# Patient Record
Sex: FEMALE | Race: WHITE | NOT HISPANIC OR LATINO | Employment: OTHER | URBAN - METROPOLITAN AREA
[De-identification: names, ages, dates, MRNs, and addresses within clinical notes are randomized per-mention and may not be internally consistent; named-entity substitution may affect disease eponyms.]

---

## 2018-11-05 LAB — HIV 1+2 AB+HIV1 P24 AG SERPL QL IA: NORMAL

## 2018-12-04 LAB
HPV, HIGH-RISK: NEGATIVE
PAP SMEAR: NORMAL

## 2019-11-27 ENCOUNTER — OFFICE VISIT (OUTPATIENT)
Dept: FAMILY MEDICINE | Facility: CLINIC | Age: 64
End: 2019-11-27
Payer: MEDICARE

## 2019-11-27 VITALS
TEMPERATURE: 98 F | DIASTOLIC BLOOD PRESSURE: 74 MMHG | WEIGHT: 154.75 LBS | SYSTOLIC BLOOD PRESSURE: 126 MMHG | OXYGEN SATURATION: 97 % | BODY MASS INDEX: 24.29 KG/M2 | HEART RATE: 73 BPM | HEIGHT: 67 IN

## 2019-11-27 DIAGNOSIS — H40.9 GLAUCOMA OF BOTH EYES, UNSPECIFIED GLAUCOMA TYPE: ICD-10-CM

## 2019-11-27 DIAGNOSIS — Z12.11 COLON CANCER SCREENING: ICD-10-CM

## 2019-11-27 DIAGNOSIS — R56.9 SEIZURES: ICD-10-CM

## 2019-11-27 DIAGNOSIS — M54.50 CHRONIC BILATERAL LOW BACK PAIN WITHOUT SCIATICA: Primary | ICD-10-CM

## 2019-11-27 DIAGNOSIS — G89.29 CHRONIC BILATERAL LOW BACK PAIN WITHOUT SCIATICA: Primary | ICD-10-CM

## 2019-11-27 DIAGNOSIS — Z12.31 BREAST CANCER SCREENING BY MAMMOGRAM: ICD-10-CM

## 2019-11-27 DIAGNOSIS — G47.01 INSOMNIA DUE TO MEDICAL CONDITION: ICD-10-CM

## 2019-11-27 DIAGNOSIS — F43.10 PTSD (POST-TRAUMATIC STRESS DISORDER): ICD-10-CM

## 2019-11-27 DIAGNOSIS — I71.40 ABDOMINAL AORTIC ANEURYSM (AAA) 3.0 CM TO 5.0 CM IN DIAMETER IN FEMALE: ICD-10-CM

## 2019-11-27 DIAGNOSIS — R06.2 WHEEZING: ICD-10-CM

## 2019-11-27 PROCEDURE — 99999 PR PBB SHADOW E&M-NEW PATIENT-LVL IV: ICD-10-PCS | Mod: PBBFAC,,, | Performed by: INTERNAL MEDICINE

## 2019-11-27 PROCEDURE — 3008F BODY MASS INDEX DOCD: CPT | Mod: CPTII,S$GLB,, | Performed by: INTERNAL MEDICINE

## 2019-11-27 PROCEDURE — 99499 UNLISTED E&M SERVICE: CPT | Mod: S$GLB,,, | Performed by: INTERNAL MEDICINE

## 2019-11-27 PROCEDURE — 99499 RISK ADDL DX/OHS AUDIT: ICD-10-PCS | Mod: S$GLB,,, | Performed by: INTERNAL MEDICINE

## 2019-11-27 PROCEDURE — 99204 PR OFFICE/OUTPT VISIT, NEW, LEVL IV, 45-59 MIN: ICD-10-PCS | Mod: S$GLB,,, | Performed by: INTERNAL MEDICINE

## 2019-11-27 PROCEDURE — 3008F PR BODY MASS INDEX (BMI) DOCUMENTED: ICD-10-PCS | Mod: CPTII,S$GLB,, | Performed by: INTERNAL MEDICINE

## 2019-11-27 PROCEDURE — 99999 PR PBB SHADOW E&M-NEW PATIENT-LVL IV: CPT | Mod: PBBFAC,,, | Performed by: INTERNAL MEDICINE

## 2019-11-27 PROCEDURE — 99204 OFFICE O/P NEW MOD 45 MIN: CPT | Mod: S$GLB,,, | Performed by: INTERNAL MEDICINE

## 2019-11-27 RX ORDER — DIAZEPAM 2 MG/1
2 TABLET ORAL 4 TIMES DAILY
Qty: 120 TABLET | Refills: 3 | Status: SHIPPED | OUTPATIENT
Start: 2019-11-27 | End: 2020-03-19 | Stop reason: SDUPTHER

## 2019-11-27 RX ORDER — DORZOLAMIDE HCL 20 MG/ML
SOLUTION/ DROPS OPHTHALMIC
COMMUNITY
Start: 2019-03-13 | End: 2022-09-22 | Stop reason: ALTCHOICE

## 2019-11-27 RX ORDER — LATANOPROST 50 UG/ML
SOLUTION/ DROPS OPHTHALMIC
Refills: 11 | COMMUNITY
Start: 2019-11-25 | End: 2023-01-04 | Stop reason: SDUPTHER

## 2019-11-27 RX ORDER — BRIMONIDINE TARTRATE 1 MG/ML
SOLUTION/ DROPS OPHTHALMIC
COMMUNITY
End: 2022-09-22 | Stop reason: SDUPTHER

## 2019-11-27 RX ORDER — TEMAZEPAM 15 MG/1
15 CAPSULE ORAL NIGHTLY PRN
Qty: 30 CAPSULE | Refills: 3 | Status: SHIPPED | OUTPATIENT
Start: 2019-11-27 | End: 2019-12-27

## 2019-11-27 RX ORDER — KETOROLAC TROMETHAMINE 10 MG/1
10 TABLET, FILM COATED ORAL EVERY 6 HOURS PRN
Qty: 30 TABLET | Refills: 3 | Status: SHIPPED | OUTPATIENT
Start: 2019-11-27 | End: 2024-03-11

## 2019-11-27 RX ORDER — TIMOLOL MALEATE 5 MG/ML
SOLUTION/ DROPS OPHTHALMIC
Refills: 4 | COMMUNITY
Start: 2019-10-31 | End: 2022-09-22 | Stop reason: ALTCHOICE

## 2019-11-27 RX ORDER — ALBUTEROL SULFATE 90 UG/1
2 AEROSOL, METERED RESPIRATORY (INHALATION) EVERY 6 HOURS PRN
Qty: 18 G | Refills: 3 | Status: SHIPPED | OUTPATIENT
Start: 2019-11-27 | End: 2023-07-26

## 2019-11-27 NOTE — PROGRESS NOTES
Patient ID: Fauzia Cornelius     Chief Complaint:   Chief Complaint   Patient presents with    [facility]    Abdominal aneurysym     Dr. Eric martin. is watching it    Tinnitus        HPI: New Patient to me and [facility]. Has a few Complains of today: lumbago without sciatica due to a L5/S1 bone on bone. She takes a good amount of aspirin each day and that I think is causing her tinnitus. Will switch her to Toradol 10 mg every 6 hours as needed and advise her to stop aspirin. Admits to PTSD that's been bad over the past year. She was seeing Dr. Fulton, but he has left. 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 She also had Temazepam at night as needed. Has a known 3 cm Abdominal Aortic Aneurysm that Dr. Eros Reyes will monitor.     Review of Systems   Constitutional: Negative.    HENT: Negative.    Eyes: Negative.    Respiratory: Negative.    Cardiovascular: Negative.    Gastrointestinal: Negative.    Endocrine: Negative.    Genitourinary: Negative.    Musculoskeletal: Negative.    Skin: Negative.    Allergic/Immunologic: Negative.    Neurological: Negative.    Hematological: Negative.    Psychiatric/Behavioral: Negative.           Objective:      Physical Exam   Physical Exam   Constitutional: She is oriented to person, place, and time. She appears well-developed and well-nourished.   HENT:   Head: Normocephalic and atraumatic.   Nose: Nose normal.   Mouth/Throat: Oropharynx is clear and moist.   Very poor dentition    Eyes: Pupils are equal, round, and reactive to light. Conjunctivae and EOM are normal.   Left eye coloboma    Neck: Normal range of motion. Neck supple.   Cardiovascular: Normal rate, regular rhythm, normal heart sounds and intact distal pulses.   Pulmonary/Chest: Effort normal.   Barrel Chest Deformity. Mild Left sided expiratory wheezes. Decreased Breath Sounds in bilateral bases.    Abdominal: Soft. Bowel sounds are  "normal.   Musculoskeletal: Normal range of motion.   Neurological: She is alert and oriented to person, place, and time.   Skin: Skin is warm and dry. Capillary refill takes less than 2 seconds.   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 Her behavior is normal. Judgment and thought content normal.   Nursing note and vitals reviewed.      Current Outpatient Medications:     brimonidine 0.1% (ALPHAGAN P) 0.1 % Drop, Alphagan P 0.1 % eye drops, Disp: , Rfl:     dorzolamide (TRUSOPT) 2 % ophthalmic solution, INSTILL ONE DROP IN EACH EYE THREE TIMES DAILY, Disp: , Rfl:     latanoprost 0.005 % ophthalmic solution, put ONE drop IN EACH EYE EVERY DAY, Disp: , Rfl: 11    timolol maleate 0.5% (TIMOPTIC) 0.5 % Drop, PUT ONE DROP IN EACH EYE TWICE DAILY, Disp: , Rfl: 4    albuterol (VENTOLIN HFA) 90 mcg/actuation inhaler, Inhale 2 puffs into the lungs every 6 (six) hours as needed for Wheezing. Rescue, Disp: 18 g, Rfl: 3    diazePAM (VALIUM) 2 MG tablet, Take 1 tablet (2 mg total) by mouth 4 (four) times daily., Disp: 120 tablet, Rfl: 3    ketorolac (TORADOL) 10 mg tablet, Take 1 tablet (10 mg total) by mouth every 6 (six) hours as needed for Pain., Disp: 30 tablet, Rfl: 3    temazepam (RESTORIL) 15 mg Cap, Take 1 capsule (15 mg total) by mouth nightly as needed., Disp: 30 capsule, Rfl: 3         Vitals:   Vitals:    11/27/19 1157   BP: 126/74   Pulse: 73   Temp: 97.5 °F (36.4 °C)   TempSrc: Temporal   SpO2: 97%   Weight: 70.2 kg (154 lb 12.2 oz)   Height: 5' 7" (1.702 m)      Assessment:       Patient Active Problem List    Diagnosis Date Noted    Insomnia due to medical condition 11/27/2019    Glaucoma     Lumbago without sciatica     PTSD (post-traumatic stress disorder)     Seizures     Abdominal aortic aneurysm (AAA) 3.0 cm to 5.0 cm in diameter in female           Plan:       Fauzia was seen today for " establish care, abdominal aneurysym and tinnitus.    Diagnoses and all orders for this visit:    Chronic bilateral low back pain without sciatica  -     ketorolac (TORADOL) 10 mg tablet; Take 1 tablet (10 mg total) by mouth every 6 (six) hours as needed for Pain.  -     X-Ray Lumbar Spine Complete 5 View; Future  Avoid aspirin     Insomnia due to medical condition  -     temazepam (RESTORIL) 15 mg Cap; Take 1 capsule (15 mg total) by mouth nightly as needed.    PTSD (post-traumatic stress disorder)  -     diazePAM (VALIUM) 2 MG tablet; Take 1 tablet (2 mg total) by mouth 4 (four) times daily.  -     Ambulatory referral to Psychology    Glaucoma of both eyes, unspecified glaucoma type  Continue drops    Seizures  -     CBC auto differential; Future  -     Comprehensive metabolic panel; Future  -     Hepatitis C antibody; Future  -     Lipid panel; Future    Abdominal aortic aneurysm (AAA) 3.0 cm to 5.0 cm in diameter in female  -     Lipid panel; Future    Breast cancer screening by mammogram  -     Mammo Digital Screening Bilat w/ Charly; Future  -     Mammo Digital Screening Bilat w/ Charly    Colon cancer screening  -     Case request GI: COLONOSCOPY    Wheezing  -     albuterol (VENTOLIN HFA) 90 mcg/actuation inhaler; Inhale 2 puffs into the lungs every 6 (six) hours as needed for Wheezing. Rescue  Please consider stop smoking

## 2019-12-02 DIAGNOSIS — Z12.11 COLON CANCER SCREENING: ICD-10-CM

## 2019-12-19 ENCOUNTER — TELEPHONE (OUTPATIENT)
Dept: FAMILY MEDICINE | Facility: CLINIC | Age: 64
End: 2019-12-19

## 2019-12-19 DIAGNOSIS — F43.10 PTSD (POST-TRAUMATIC STRESS DISORDER): ICD-10-CM

## 2019-12-19 NOTE — TELEPHONE ENCOUNTER
----- Message from Hazel Cuenca sent at 12/19/2019  3:04 PM CST -----  Contact: Patient  Type: Needs Medical Advice    Who Called:  Patient  Best Call Back Number:   Additional Information: Calling to speak with the nurse to discuss a change in her medication that she is requesting. She would like her diazePAM (VALIUM) 2 MG tablet to upped to 5mg, and her temazepam (RESTORIL) 15 mg Cap to be upped to 30 mg. She advised she is not due for a refill just yet but would like this dosage to reflect on her next one.

## 2019-12-19 NOTE — TELEPHONE ENCOUNTER
Callback to patient--asking to increase Temazepam to 30mg nightly, current dosage is not really helping her sleep.    Patient also asking to increase her Diazepam to 5mg, states she has bad anxiety and PTSD and current medication is just not controlling her anxiety

## 2019-12-21 NOTE — TELEPHONE ENCOUNTER
Diazepam and Temazepam are in the same family. The more you take of one, the less effective the other becomes. She's got Diazepam 2 mg - 4 times daily as needed. I'm not comfortable increasing that does to 5 mg as I'm afraid she will overdose. I would agree to increasing the Temazepam to 30 mg / night, but I want her to make an appointment with me soon so we can discuss other meds to treat her PTSD.

## 2020-01-10 ENCOUNTER — TELEPHONE (OUTPATIENT)
Dept: FAMILY MEDICINE | Facility: CLINIC | Age: 65
End: 2020-01-10

## 2020-01-10 NOTE — TELEPHONE ENCOUNTER
I have tried to call the patient multiple times. The phone number listed is not in service and there is not an alternative phone number listed in her file.

## 2020-01-10 NOTE — TELEPHONE ENCOUNTER
----- Message from Blank Mulligan sent at 1/10/2020  2:28 PM CST -----  Contact: pt  Pt calling states that she had called back before the holidays requesting that she would like her diazePAM (VALIUM) 2 MG tablet to upped to 5mg, and her temazepam (RESTORIL) 15 mg Cap to be upped to 30 mg an no one called her back. Also pt says that her insurance is no covering her ketorolac (TORADOL) 10 mg tablet and charging her full price so she is wanting an alternative that is less expensive....[phone] (home)           .    [facility] [place] - [address]  [address]  [place] [address] [address]  Phone: [phone] Fax: [phone]

## 2020-02-28 ENCOUNTER — TELEPHONE (OUTPATIENT)
Dept: GASTROENTEROLOGY | Facility: CLINIC | Age: 65
End: 2020-02-28

## 2020-03-12 ENCOUNTER — TELEPHONE (OUTPATIENT)
Dept: GASTROENTEROLOGY | Facility: CLINIC | Age: 65
End: 2020-03-12

## 2020-03-19 ENCOUNTER — TELEPHONE (OUTPATIENT)
Dept: FAMILY MEDICINE | Facility: CLINIC | Age: 65
End: 2020-03-19

## 2020-03-19 DIAGNOSIS — F43.10 PTSD (POST-TRAUMATIC STRESS DISORDER): ICD-10-CM

## 2020-03-19 DIAGNOSIS — G47.01 INSOMNIA DUE TO MEDICAL CONDITION: ICD-10-CM

## 2020-03-19 RX ORDER — DIAZEPAM 2 MG/1
2 TABLET ORAL 4 TIMES DAILY
Qty: 120 TABLET | Refills: 3 | Status: SHIPPED | OUTPATIENT
Start: 2020-03-19 | End: 2020-06-11 | Stop reason: SDUPTHER

## 2020-03-19 RX ORDER — TEMAZEPAM 30 MG/1
30 CAPSULE ORAL NIGHTLY PRN
Qty: 30 CAPSULE | Refills: 3 | Status: SHIPPED | OUTPATIENT
Start: 2020-03-19 | End: 2020-04-18

## 2020-03-19 NOTE — TELEPHONE ENCOUNTER
I refilled her diazepam as written.  I also sent in a refill for temazepam 30 mg per night per her request.  Diazepam and temazepam are in the same family so it is very probable that she will get tolerant to both these medicines the longer she takes them.  This will manifest as her not getting the same effect after she takes the medicines as she did in the past.

## 2020-03-19 NOTE — TELEPHONE ENCOUNTER
Asking for refill diazepam and temazepam--did not pend, because patient is asking for increase to temazepam 30mg(states more effective)    To [facility] in [place]

## 2020-03-19 NOTE — TELEPHONE ENCOUNTER
----- Message from Laure Vitale sent at 3/19/2020  3:27 PM CDT -----  Contact: Fauzia oh  Type:  RX Refill Request    Who Called:  Fauzia  Refill or New Rx:  refill  RX Name and Strength:  diazePAM (VALIUM) 2 MG tablet, temazepam (RESTORIL) 15 mg Cap  How is the patient currently taking it? (ex. 1XDay):  As directed  Is this a 30 day or 90 day RX:  30  Preferred Pharmacy with phone number:      [facility] - [place], [place] - [address]  [address] [address]  Phone: [phone] Fax: [phone]      Local or Mail Order:  local  Ordering Provider:  Myles Santos Call Back Number:  [phone]  Additional Information:  Pls call pt regarding her refills

## 2020-04-16 ENCOUNTER — TELEPHONE (OUTPATIENT)
Dept: FAMILY MEDICINE | Facility: CLINIC | Age: 65
End: 2020-04-16

## 2020-04-16 DIAGNOSIS — L29.9 ITCHING: Primary | ICD-10-CM

## 2020-04-16 RX ORDER — HYDROXYZINE HYDROCHLORIDE 25 MG/1
25 TABLET, FILM COATED ORAL 3 TIMES DAILY PRN
Qty: 30 TABLET | Refills: 0 | Status: SHIPPED | OUTPATIENT
Start: 2020-04-16 | End: 2020-06-11 | Stop reason: SDUPTHER

## 2020-04-16 NOTE — TELEPHONE ENCOUNTER
I sent a limited quantity of Atarax to pharmacy. Do you know why you're itching? Let me know if the itching persists.

## 2020-04-16 NOTE — TELEPHONE ENCOUNTER
----- Message from Gamaliel Marcano sent at 4/16/2020  3:20 PM CDT -----  Contact: pt  Pt is requesting a prescription for Atarax to be sent over to the pharmacy (it's an anti itch medication)   Call Back#[phone]  Thanks      [facility] [place] - [facility] [address]  Phone: [phone] Fax: [phone]

## 2020-05-13 ENCOUNTER — TELEPHONE (OUTPATIENT)
Dept: FAMILY MEDICINE | Facility: CLINIC | Age: 65
End: 2020-05-13

## 2020-05-13 NOTE — TELEPHONE ENCOUNTER
----- Message from Albania De La Torre sent at 5/13/2020  5:07 PM CDT -----  Contact: [phone]/ self   Patient requesting to speak with you regarding orders that she needs faxed over to [facility]. Please advise.

## 2020-05-15 ENCOUNTER — TELEPHONE (OUTPATIENT)
Dept: FAMILY MEDICINE | Facility: CLINIC | Age: 65
End: 2020-05-15

## 2020-05-15 NOTE — TELEPHONE ENCOUNTER
Callback to patient--needs order faxed to [facility] [place]      Patient states she has extreme low back pain and wants to be assured that we can fix her problems with her back    Advised depends that depending on what xray show; would depend on what plan of care would be    Fax [phone]

## 2020-05-15 NOTE — TELEPHONE ENCOUNTER
----- Message from Irma Martinez sent at 5/15/2020  1:21 PM CDT -----  Contact: Pt  Type: Needs Medical Advice    Who Called:  Pt  Best Call Back Number: [phone]  Additional Information: Requesting a call back regarding pt woud like all her orders be sent to diagnostic imaging so she can have them done before pt sees doctor   Please Advise ---Thank you

## 2020-05-27 ENCOUNTER — PATIENT OUTREACH (OUTPATIENT)
Dept: ADMINISTRATIVE | Facility: HOSPITAL | Age: 65
End: 2020-05-27

## 2020-05-27 LAB — HEP C VIRUS AB: REACTIVE

## 2020-05-27 NOTE — PROGRESS NOTES
Chart review completed 05/27/2020.  Care Everywhere updates requested and reviewed.  Immunizations reconciled. Media reviewed.     Lab willie, and quest reviewed.   updated with external mammogram report.   Message sent to patient's portal.    Health Maintenance Due   Topic Date Due    Hepatitis C Screening  1955    Lipid Panel  1955    HIV Screening  03/07/1971    TETANUS VACCINE  03/07/1974    Shingles Vaccine (1 of 2) 03/07/2006    Colonoscopy  03/07/2006    Pap Smear with HPV Cotest  02/26/2019

## 2020-06-11 ENCOUNTER — OFFICE VISIT (OUTPATIENT)
Dept: FAMILY MEDICINE | Facility: CLINIC | Age: 65
End: 2020-06-11
Payer: MEDICARE

## 2020-06-11 VITALS
HEIGHT: 67 IN | BODY MASS INDEX: 25.6 KG/M2 | WEIGHT: 163.13 LBS | OXYGEN SATURATION: 95 % | HEART RATE: 74 BPM | DIASTOLIC BLOOD PRESSURE: 84 MMHG | SYSTOLIC BLOOD PRESSURE: 128 MMHG | TEMPERATURE: 98 F

## 2020-06-11 DIAGNOSIS — F43.10 PTSD (POST-TRAUMATIC STRESS DISORDER): ICD-10-CM

## 2020-06-11 DIAGNOSIS — G47.01 INSOMNIA DUE TO MEDICAL CONDITION: ICD-10-CM

## 2020-06-11 DIAGNOSIS — L29.9 ITCHING: ICD-10-CM

## 2020-06-11 DIAGNOSIS — R56.9 SEIZURES: ICD-10-CM

## 2020-06-11 DIAGNOSIS — M81.6 LOCALIZED OSTEOPOROSIS WITHOUT CURRENT PATHOLOGICAL FRACTURE: ICD-10-CM

## 2020-06-11 DIAGNOSIS — F41.9 ANXIETY: Primary | ICD-10-CM

## 2020-06-11 DIAGNOSIS — G89.29 CHRONIC BILATERAL LOW BACK PAIN WITHOUT SCIATICA: ICD-10-CM

## 2020-06-11 DIAGNOSIS — M54.50 CHRONIC BILATERAL LOW BACK PAIN WITHOUT SCIATICA: ICD-10-CM

## 2020-06-11 DIAGNOSIS — I71.40 ABDOMINAL AORTIC ANEURYSM (AAA) 3.0 CM TO 5.0 CM IN DIAMETER IN FEMALE: ICD-10-CM

## 2020-06-11 PROCEDURE — 99999 PR PBB SHADOW E&M-EST. PATIENT-LVL III: ICD-10-PCS | Mod: PBBFAC,,, | Performed by: INTERNAL MEDICINE

## 2020-06-11 PROCEDURE — 99214 PR OFFICE/OUTPT VISIT, EST, LEVL IV, 30-39 MIN: ICD-10-PCS | Mod: S$GLB,,, | Performed by: INTERNAL MEDICINE

## 2020-06-11 PROCEDURE — 3008F PR BODY MASS INDEX (BMI) DOCUMENTED: ICD-10-PCS | Mod: CPTII,S$GLB,, | Performed by: INTERNAL MEDICINE

## 2020-06-11 PROCEDURE — 99214 OFFICE O/P EST MOD 30 MIN: CPT | Mod: S$GLB,,, | Performed by: INTERNAL MEDICINE

## 2020-06-11 PROCEDURE — 99499 UNLISTED E&M SERVICE: CPT | Mod: S$GLB,,, | Performed by: INTERNAL MEDICINE

## 2020-06-11 PROCEDURE — 3008F BODY MASS INDEX DOCD: CPT | Mod: CPTII,S$GLB,, | Performed by: INTERNAL MEDICINE

## 2020-06-11 PROCEDURE — 99499 RISK ADDL DX/OHS AUDIT: ICD-10-PCS | Mod: S$GLB,,, | Performed by: INTERNAL MEDICINE

## 2020-06-11 PROCEDURE — 99999 PR PBB SHADOW E&M-EST. PATIENT-LVL III: CPT | Mod: PBBFAC,,, | Performed by: INTERNAL MEDICINE

## 2020-06-11 RX ORDER — TEMAZEPAM 30 MG/1
30 CAPSULE ORAL NIGHTLY PRN
Qty: 30 CAPSULE | Refills: 3 | Status: SHIPPED | OUTPATIENT
Start: 2020-06-11 | End: 2020-12-11 | Stop reason: SDUPTHER

## 2020-06-11 RX ORDER — DIAZEPAM 5 MG/1
5 TABLET ORAL EVERY 6 HOURS PRN
Qty: 120 TABLET | Refills: 3 | Status: SHIPPED | OUTPATIENT
Start: 2020-06-11 | End: 2020-12-11 | Stop reason: SDUPTHER

## 2020-06-11 RX ORDER — HYDROXYZINE HYDROCHLORIDE 25 MG/1
25 TABLET, FILM COATED ORAL 3 TIMES DAILY PRN
Qty: 90 TABLET | Refills: 3 | Status: SHIPPED | OUTPATIENT
Start: 2020-06-11 | End: 2021-09-16 | Stop reason: SDUPTHER

## 2020-06-11 RX ORDER — ALENDRONATE SODIUM 70 MG/1
70 TABLET ORAL
Qty: 12 TABLET | Refills: 3 | Status: SHIPPED | OUTPATIENT
Start: 2020-06-11 | End: 2023-07-26

## 2020-06-11 RX ORDER — TEMAZEPAM 30 MG/1
CAPSULE ORAL
COMMUNITY
Start: 2020-05-14 | End: 2020-06-11 | Stop reason: SDUPTHER

## 2020-06-11 NOTE — PROGRESS NOTES
Patient ID: Fauzia Cornelius     Chief Complaint:   Chief Complaint   Patient presents with    Review test results    Review medications        HPI: Follow up for labs that show hyperlipidemia. She doesn't recall ever having a Lipid panel in the past and she has a family history of Heart disease- defer to Dr. Reyes. He will also monitor her Abdominal Aortic Aneurysm that looks bigger on recent xray of back. Other labs ok, but she did have a false positive Hepatitis C antibody. She Complains of chronic lumbago and degenerative joint disease in spine- will get MRI and consider referring to Neurosurgery. She really Complains of Uncontrolled  Anxiety despite Diazepam 2 mg 4 times/ day. She was previously on 10 mg 4 times/ day- will increase to 5 mg 4 times /day and refill Temazepam for insomnia. I explained the concept of tolerance. She has tried numerous SSRI's but they didn't work, so she declines them. Has numerous hypopigmented macules on upper body due to nervous itching- improved on Atarax. DEXA= osteoporosis in forearm and almost in Right hip- will start Fosamax.     Review of Systems   Constitutional: Negative.    HENT: Negative.    Eyes: Negative.    Respiratory: Negative.    Cardiovascular: Negative.    Gastrointestinal: Negative.    Endocrine: Negative.    Genitourinary: Negative.    Musculoskeletal: Positive for back pain.   Skin: Positive for rash.   Allergic/Immunologic: Negative.    Neurological: Negative.    Hematological: Negative.    Psychiatric/Behavioral: Negative.           Objective:      Physical Exam   Physical Exam   Constitutional: She is oriented to person, place, and time. She appears well-developed and well-nourished.   HENT:   Head: Normocephalic and atraumatic.   Nose: Nose normal.   Mouth/Throat: Oropharynx is clear and moist.   Eyes: Pupils are equal, round, and reactive to light. Conjunctivae and EOM are normal.   Neck: Normal range of motion. Neck supple.   Cardiovascular: Normal  rate, regular rhythm, normal heart sounds and intact distal pulses.   Pulmonary/Chest: Effort normal and breath sounds normal.   Slight wheezing that clears w/ deep inspiration.    Abdominal: Soft. Bowel sounds are normal.   Musculoskeletal: Normal range of motion.   Neurological: She is alert and oriented to person, place, and time.   Skin: Skin is warm and dry. Capillary refill takes less than 2 seconds.   Psychiatric: She has a normal mood and affect. Her behavior is normal. Judgment and thought content normal.   Nursing note and vitals reviewed.      Current Outpatient Medications:     albuterol (VENTOLIN HFA) 90 mcg/actuation inhaler, Inhale 2 puffs into the lungs every 6 (six) hours as needed for Wheezing. Rescue, Disp: 18 g, Rfl: 3    brimonidine 0.1% (ALPHAGAN P) 0.1 % Drop, Alphagan P 0.1 % eye drops, Disp: , Rfl:     diazePAM (VALIUM) 5 MG tablet, Take 1 tablet (5 mg total) by mouth every 6 (six) hours as needed for Anxiety., Disp: 120 tablet, Rfl: 3    dorzolamide (TRUSOPT) 2 % ophthalmic solution, INSTILL ONE DROP IN EACH EYE THREE TIMES DAILY, Disp: , Rfl:     hydrOXYzine HCL (ATARAX) 25 MG tablet, Take 1 tablet (25 mg total) by mouth 3 (three) times daily as needed for Itching., Disp: 90 tablet, Rfl: 3    ketorolac (TORADOL) 10 mg tablet, Take 1 tablet (10 mg total) by mouth every 6 (six) hours as needed for Pain., Disp: 30 tablet, Rfl: 3    latanoprost 0.005 % ophthalmic solution, put ONE drop IN EACH EYE EVERY DAY, Disp: , Rfl: 11    timolol maleate 0.5% (TIMOPTIC) 0.5 % Drop, PUT ONE DROP IN EACH EYE TWICE DAILY, Disp: , Rfl: 4    alendronate (FOSAMAX) 70 MG tablet, Take 1 tablet (70 mg total) by mouth every 7 days., Disp: 12 tablet, Rfl: 3    temazepam (RESTORIL) 30 mg capsule, Take 1 capsule (30 mg total) by mouth nightly as needed for Insomnia., Disp: 30 capsule, Rfl: 3         Vitals:   Vitals:    06/11/20 0916   BP: 128/84   Pulse: 74   Temp: 97.9 °F (36.6 °C)   TempSrc: Oral   SpO2:  "95%   Weight: 74 kg (163 lb 2.3 oz)   Height: 5' 7" (1.702 m)      Assessment:       Patient Active Problem List    Diagnosis Date Noted    Insomnia due to medical condition 11/27/2019    Glaucoma     Lumbago without sciatica     PTSD (post-traumatic stress disorder)     Seizures     Abdominal aortic aneurysm (AAA) 3.0 cm to 5.0 cm in diameter in female           Plan:       Fauzia Cornelius  was seen today for follow-up and may need lab work.    Diagnoses and all orders for this visit:    Fauzia was seen today for review test results and review medications.    Diagnoses and all orders for this visit:    Anxiety  -     diazePAM (VALIUM) 5 MG tablet; Take 1 tablet (5 mg total) by mouth every 6 (six) hours as needed for Anxiety.    Itching  -     hydrOXYzine HCL (ATARAX) 25 MG tablet; Take 1 tablet (25 mg total) by mouth 3 (three) times daily as needed for Itching.    PTSD (post-traumatic stress disorder)  -     diazePAM (VALIUM) 5 MG tablet; Take 1 tablet (5 mg total) by mouth every 6 (six) hours as needed for Anxiety.    Insomnia due to medical condition  -     temazepam (RESTORIL) 30 mg capsule; Take 1 capsule (30 mg total) by mouth nightly as needed for Insomnia.    Seizures  Controlled     Abdominal aortic aneurysm (AAA) 3.0 cm to 5.0 cm in diameter in female  Monitored by Dr. Reyes    Localized osteoporosis without current pathological fracture  -     alendronate (FOSAMAX) 70 MG tablet; Take 1 tablet (70 mg total) by mouth every 7 days.    Chronic bilateral low back pain without sciatica  -     MRI Lumbar Spine Without Contrast; Future                  "

## 2020-06-25 ENCOUNTER — HOSPITAL ENCOUNTER (OUTPATIENT)
Dept: RADIOLOGY | Facility: HOSPITAL | Age: 65
Discharge: HOME | End: 2020-06-25
Attending: INTERNAL MEDICINE
Payer: MEDICARE

## 2020-06-25 DIAGNOSIS — M54.50 CHRONIC BILATERAL LOW BACK PAIN WITHOUT SCIATICA: ICD-10-CM

## 2020-06-25 DIAGNOSIS — G89.29 CHRONIC BILATERAL LOW BACK PAIN WITHOUT SCIATICA: ICD-10-CM

## 2020-06-25 PROCEDURE — 72148 MRI LUMBAR SPINE W/O DYE: CPT | Mod: TC,PO

## 2020-06-25 PROCEDURE — 72148 MRI LUMBAR SPINE WITHOUT CONTRAST: ICD-10-PCS | Mod: 26,,, | Performed by: RADIOLOGY

## 2020-06-25 PROCEDURE — 72148 MRI LUMBAR SPINE W/O DYE: CPT | Mod: 26,,, | Performed by: RADIOLOGY

## 2020-07-05 ENCOUNTER — TELEPHONE (OUTPATIENT)
Dept: FAMILY MEDICINE | Facility: CLINIC | Age: 65
End: 2020-07-05

## 2020-07-05 NOTE — TELEPHONE ENCOUNTER
I recommend Dr. Billy Abad. Let me know if I can place the referral. As for the pain and lack of sleep, we can try a steroid pack to decrease inflammation in place of the Toradol.

## 2020-07-05 NOTE — TELEPHONE ENCOUNTER
----- Message from Noemi Newman LPN sent at 6/29/2020  3:54 PM CDT -----  .Results given; pt stated understanding      Patient would like to who you recommended

## 2020-07-16 ENCOUNTER — PATIENT OUTREACH (OUTPATIENT)
Dept: ADMINISTRATIVE | Facility: HOSPITAL | Age: 65
End: 2020-07-16

## 2020-07-20 ENCOUNTER — TELEPHONE (OUTPATIENT)
Dept: RADIOLOGY | Facility: HOSPITAL | Age: 65
End: 2020-07-20

## 2020-09-28 ENCOUNTER — PATIENT OUTREACH (OUTPATIENT)
Dept: ADMINISTRATIVE | Facility: HOSPITAL | Age: 65
End: 2020-09-28

## 2020-09-28 NOTE — PROGRESS NOTES
Health Maintenance Due   Topic Date Due    TETANUS VACCINE  1974    High Dose Statin  1977    Shingles Vaccine (1 of 2) 2006    Colorectal Cancer Screening  2006    Influenza Vaccine (1) 2020       Chart review completed 2020.  Care Everywhere updates requested and reviewed.  Immunizations reconciled. Media reports reviewed.  Duplicate HM overrides and  orders removed.  Overdue HM topic chart audit and/or requested.  Overdue lab testing linked to upcoming lab appointments if applies.      HM updated with external pap smear and HIV screening

## 2020-10-30 ENCOUNTER — PATIENT MESSAGE (OUTPATIENT)
Dept: ADMINISTRATIVE | Facility: HOSPITAL | Age: 65
End: 2020-10-30

## 2020-12-11 DIAGNOSIS — F41.9 ANXIETY: ICD-10-CM

## 2020-12-11 DIAGNOSIS — G47.01 INSOMNIA DUE TO MEDICAL CONDITION: ICD-10-CM

## 2020-12-11 DIAGNOSIS — F43.10 PTSD (POST-TRAUMATIC STRESS DISORDER): ICD-10-CM

## 2020-12-11 RX ORDER — DIAZEPAM 5 MG/1
5 TABLET ORAL EVERY 6 HOURS PRN
Qty: 120 TABLET | Refills: 2 | Status: SHIPPED | OUTPATIENT
Start: 2020-12-11 | End: 2020-12-14 | Stop reason: SDUPTHER

## 2020-12-11 RX ORDER — TEMAZEPAM 30 MG/1
30 CAPSULE ORAL NIGHTLY PRN
Qty: 30 CAPSULE | Refills: 3 | Status: SHIPPED | OUTPATIENT
Start: 2020-12-11 | End: 2020-12-14 | Stop reason: SDUPTHER

## 2020-12-11 NOTE — TELEPHONE ENCOUNTER
----- Message from Roosevelt Gotti sent at 12/11/2020  9:18 AM CST -----  Regarding: rx  Contact: self  Type:  RX Refill Request    Who Called:  self   Refill or New Rx:  new rx  RX Name and Strength:  diazepam, rx temazepam   How is the patient currently taking it? (ex. 1XDay):    Is this a 30 day or 90 day RX:  30 day supply  Preferred Pharmacy with phone number:  [facility]'s pharmacy   Local or Mail Order:   local Ordering Provider: Dr Edmund Bueno  [facility] Call Back Number:  [phone]  Additional Information:

## 2020-12-13 ENCOUNTER — PATIENT MESSAGE (OUTPATIENT)
Dept: FAMILY MEDICINE | Facility: CLINIC | Age: 65
End: 2020-12-13

## 2020-12-13 DIAGNOSIS — G47.01 INSOMNIA DUE TO MEDICAL CONDITION: ICD-10-CM

## 2020-12-13 DIAGNOSIS — F41.9 ANXIETY: ICD-10-CM

## 2020-12-13 DIAGNOSIS — F43.10 PTSD (POST-TRAUMATIC STRESS DISORDER): ICD-10-CM

## 2020-12-14 RX ORDER — TEMAZEPAM 30 MG/1
30 CAPSULE ORAL NIGHTLY PRN
Qty: 30 CAPSULE | Refills: 3 | Status: SHIPPED | OUTPATIENT
Start: 2020-12-14 | End: 2021-08-06 | Stop reason: SDUPTHER

## 2020-12-14 RX ORDER — DIAZEPAM 5 MG/1
5 TABLET ORAL EVERY 6 HOURS PRN
Qty: 120 TABLET | Refills: 2 | Status: SHIPPED | OUTPATIENT
Start: 2020-12-14 | End: 2021-08-06 | Stop reason: SDUPTHER

## 2020-12-14 NOTE — TELEPHONE ENCOUNTER
I refilled her medicines and I do have an upcoming appointment with her in a few weeks, but I want to let her know that I am going to have to see her every 3 months to continue to refill her diazepam due to new practice guidelines.

## 2020-12-23 ENCOUNTER — TELEPHONE (OUTPATIENT)
Dept: FAMILY MEDICINE | Facility: CLINIC | Age: 65
End: 2020-12-23

## 2021-01-13 ENCOUNTER — OFFICE VISIT (OUTPATIENT)
Dept: FAMILY MEDICINE | Facility: CLINIC | Age: 66
End: 2021-01-13
Payer: COMMERCIAL

## 2021-01-13 ENCOUNTER — PATIENT MESSAGE (OUTPATIENT)
Dept: FAMILY MEDICINE | Facility: CLINIC | Age: 66
End: 2021-01-13

## 2021-01-13 DIAGNOSIS — F32.9 REACTIVE DEPRESSION: Primary | ICD-10-CM

## 2021-01-13 DIAGNOSIS — S02.2XXK CLOSED FRACTURE OF NASAL BONE WITH NONUNION, SUBSEQUENT ENCOUNTER: ICD-10-CM

## 2021-01-13 DIAGNOSIS — F43.10 PTSD (POST-TRAUMATIC STRESS DISORDER): ICD-10-CM

## 2021-01-13 PROCEDURE — 99499 RISK ADDL DX/OHS AUDIT: ICD-10-PCS | Mod: 95,,, | Performed by: INTERNAL MEDICINE

## 2021-01-13 PROCEDURE — 99214 PR OFFICE/OUTPT VISIT, EST, LEVL IV, 30-39 MIN: ICD-10-PCS | Mod: 95,,, | Performed by: INTERNAL MEDICINE

## 2021-01-13 PROCEDURE — 99214 OFFICE O/P EST MOD 30 MIN: CPT | Mod: 95,,, | Performed by: INTERNAL MEDICINE

## 2021-01-13 PROCEDURE — 99499 UNLISTED E&M SERVICE: CPT | Mod: 95,,, | Performed by: INTERNAL MEDICINE

## 2021-01-13 RX ORDER — VORTIOXETINE 5 MG/1
5 TABLET, FILM COATED ORAL DAILY
Qty: 30 TABLET | Refills: 3 | Status: SHIPPED | OUTPATIENT
Start: 2021-01-13 | End: 2021-09-16 | Stop reason: SINTOL

## 2021-01-20 ENCOUNTER — PATIENT MESSAGE (OUTPATIENT)
Dept: FAMILY MEDICINE | Facility: CLINIC | Age: 66
End: 2021-01-20

## 2021-02-22 ENCOUNTER — DOCUMENTATION ONLY (OUTPATIENT)
Dept: FAMILY MEDICINE | Facility: CLINIC | Age: 66
End: 2021-02-22

## 2021-05-06 ENCOUNTER — PATIENT MESSAGE (OUTPATIENT)
Dept: RESEARCH | Facility: HOSPITAL | Age: 66
End: 2021-05-06

## 2021-07-07 ENCOUNTER — PATIENT MESSAGE (OUTPATIENT)
Dept: ADMINISTRATIVE | Facility: HOSPITAL | Age: 66
End: 2021-07-07

## 2021-08-06 ENCOUNTER — PATIENT MESSAGE (OUTPATIENT)
Dept: FAMILY MEDICINE | Facility: CLINIC | Age: 66
End: 2021-08-06

## 2021-08-06 DIAGNOSIS — G47.01 INSOMNIA DUE TO MEDICAL CONDITION: ICD-10-CM

## 2021-08-06 DIAGNOSIS — F43.10 PTSD (POST-TRAUMATIC STRESS DISORDER): ICD-10-CM

## 2021-08-06 DIAGNOSIS — F41.9 ANXIETY: ICD-10-CM

## 2021-08-08 RX ORDER — TEMAZEPAM 30 MG/1
30 CAPSULE ORAL NIGHTLY PRN
Qty: 30 CAPSULE | Refills: 3 | Status: SHIPPED | OUTPATIENT
Start: 2021-08-08 | End: 2021-09-16 | Stop reason: SDUPTHER

## 2021-08-08 RX ORDER — DIAZEPAM 5 MG/1
5 TABLET ORAL EVERY 6 HOURS PRN
Qty: 120 TABLET | Refills: 2 | Status: SHIPPED | OUTPATIENT
Start: 2021-08-08 | End: 2021-09-16 | Stop reason: SDUPTHER

## 2021-09-15 ENCOUNTER — PATIENT OUTREACH (OUTPATIENT)
Dept: ADMINISTRATIVE | Facility: HOSPITAL | Age: 66
End: 2021-09-15

## 2021-09-15 ENCOUNTER — PATIENT MESSAGE (OUTPATIENT)
Dept: ADMINISTRATIVE | Facility: HOSPITAL | Age: 66
End: 2021-09-15

## 2021-09-16 ENCOUNTER — OFFICE VISIT (OUTPATIENT)
Dept: FAMILY MEDICINE | Facility: CLINIC | Age: 66
End: 2021-09-16
Payer: MEDICAID

## 2021-09-16 ENCOUNTER — DOCUMENTATION ONLY (OUTPATIENT)
Dept: FAMILY MEDICINE | Facility: CLINIC | Age: 66
End: 2021-09-16

## 2021-09-16 VITALS
WEIGHT: 179.25 LBS | OXYGEN SATURATION: 97 % | SYSTOLIC BLOOD PRESSURE: 132 MMHG | HEIGHT: 67 IN | HEART RATE: 71 BPM | DIASTOLIC BLOOD PRESSURE: 88 MMHG | BODY MASS INDEX: 28.13 KG/M2

## 2021-09-16 DIAGNOSIS — R63.5 WEIGHT GAIN: ICD-10-CM

## 2021-09-16 DIAGNOSIS — L29.9 ITCHING: ICD-10-CM

## 2021-09-16 DIAGNOSIS — Z80.8 FAMILY HISTORY OF THYROID CANCER: ICD-10-CM

## 2021-09-16 DIAGNOSIS — F43.10 PTSD (POST-TRAUMATIC STRESS DISORDER): ICD-10-CM

## 2021-09-16 DIAGNOSIS — E78.00 ELEVATED LDL CHOLESTEROL LEVEL: ICD-10-CM

## 2021-09-16 DIAGNOSIS — I71.40 ABDOMINAL AORTIC ANEURYSM (AAA) WITHOUT RUPTURE: ICD-10-CM

## 2021-09-16 DIAGNOSIS — H40.9 GLAUCOMA, UNSPECIFIED GLAUCOMA TYPE, UNSPECIFIED LATERALITY: ICD-10-CM

## 2021-09-16 DIAGNOSIS — Z12.11 COLON CANCER SCREENING: ICD-10-CM

## 2021-09-16 DIAGNOSIS — Z12.31 BREAST CANCER SCREENING BY MAMMOGRAM: ICD-10-CM

## 2021-09-16 DIAGNOSIS — F41.9 ANXIETY: Primary | ICD-10-CM

## 2021-09-16 DIAGNOSIS — G47.01 INSOMNIA DUE TO MEDICAL CONDITION: ICD-10-CM

## 2021-09-16 PROCEDURE — 3008F PR BODY MASS INDEX (BMI) DOCUMENTED: ICD-10-PCS | Mod: CPTII,S$GLB,, | Performed by: INTERNAL MEDICINE

## 2021-09-16 PROCEDURE — 1160F PR REVIEW ALL MEDS BY PRESCRIBER/CLIN PHARMACIST DOCUMENTED: ICD-10-PCS | Mod: CPTII,S$GLB,, | Performed by: INTERNAL MEDICINE

## 2021-09-16 PROCEDURE — 3075F PR MOST RECENT SYSTOLIC BLOOD PRESS GE 130-139MM HG: ICD-10-PCS | Mod: CPTII,S$GLB,, | Performed by: INTERNAL MEDICINE

## 2021-09-16 PROCEDURE — 99499 UNLISTED E&M SERVICE: CPT | Mod: S$GLB,,, | Performed by: INTERNAL MEDICINE

## 2021-09-16 PROCEDURE — 99499 RISK ADDL DX/OHS AUDIT: ICD-10-PCS | Mod: S$GLB,,, | Performed by: INTERNAL MEDICINE

## 2021-09-16 PROCEDURE — 99214 PR OFFICE/OUTPT VISIT, EST, LEVL IV, 30-39 MIN: ICD-10-PCS | Mod: S$GLB,,, | Performed by: INTERNAL MEDICINE

## 2021-09-16 PROCEDURE — 3288F FALL RISK ASSESSMENT DOCD: CPT | Mod: CPTII,S$GLB,, | Performed by: INTERNAL MEDICINE

## 2021-09-16 PROCEDURE — 1101F PR PT FALLS ASSESS DOC 0-1 FALLS W/OUT INJ PAST YR: ICD-10-PCS | Mod: CPTII,S$GLB,, | Performed by: INTERNAL MEDICINE

## 2021-09-16 PROCEDURE — 1160F RVW MEDS BY RX/DR IN RCRD: CPT | Mod: CPTII,S$GLB,, | Performed by: INTERNAL MEDICINE

## 2021-09-16 PROCEDURE — 1101F PT FALLS ASSESS-DOCD LE1/YR: CPT | Mod: CPTII,S$GLB,, | Performed by: INTERNAL MEDICINE

## 2021-09-16 PROCEDURE — 99214 OFFICE O/P EST MOD 30 MIN: CPT | Mod: S$GLB,,, | Performed by: INTERNAL MEDICINE

## 2021-09-16 PROCEDURE — 1159F PR MEDICATION LIST DOCUMENTED IN MEDICAL RECORD: ICD-10-PCS | Mod: CPTII,S$GLB,, | Performed by: INTERNAL MEDICINE

## 2021-09-16 PROCEDURE — 3075F SYST BP GE 130 - 139MM HG: CPT | Mod: CPTII,S$GLB,, | Performed by: INTERNAL MEDICINE

## 2021-09-16 PROCEDURE — 1159F MED LIST DOCD IN RCRD: CPT | Mod: CPTII,S$GLB,, | Performed by: INTERNAL MEDICINE

## 2021-09-16 PROCEDURE — 3079F PR MOST RECENT DIASTOLIC BLOOD PRESSURE 80-89 MM HG: ICD-10-PCS | Mod: CPTII,S$GLB,, | Performed by: INTERNAL MEDICINE

## 2021-09-16 PROCEDURE — 99999 PR PBB SHADOW E&M-EST. PATIENT-LVL IV: ICD-10-PCS | Mod: PBBFAC,,, | Performed by: INTERNAL MEDICINE

## 2021-09-16 PROCEDURE — 3079F DIAST BP 80-89 MM HG: CPT | Mod: CPTII,S$GLB,, | Performed by: INTERNAL MEDICINE

## 2021-09-16 PROCEDURE — 99999 PR PBB SHADOW E&M-EST. PATIENT-LVL IV: CPT | Mod: PBBFAC,,, | Performed by: INTERNAL MEDICINE

## 2021-09-16 PROCEDURE — 3288F PR FALLS RISK ASSESSMENT DOCUMENTED: ICD-10-PCS | Mod: CPTII,S$GLB,, | Performed by: INTERNAL MEDICINE

## 2021-09-16 PROCEDURE — 3008F BODY MASS INDEX DOCD: CPT | Mod: CPTII,S$GLB,, | Performed by: INTERNAL MEDICINE

## 2021-09-16 RX ORDER — DIAZEPAM 5 MG/1
5 TABLET ORAL EVERY 6 HOURS PRN
Qty: 120 TABLET | Refills: 2 | Status: SHIPPED | OUTPATIENT
Start: 2021-09-16 | End: 2021-12-21 | Stop reason: SDUPTHER

## 2021-09-16 RX ORDER — TEMAZEPAM 30 MG/1
30 CAPSULE ORAL NIGHTLY PRN
Qty: 30 CAPSULE | Refills: 5 | Status: SHIPPED | OUTPATIENT
Start: 2021-09-16 | End: 2022-04-13 | Stop reason: SDUPTHER

## 2021-09-16 RX ORDER — HYDROXYZINE HYDROCHLORIDE 25 MG/1
25 TABLET, FILM COATED ORAL 3 TIMES DAILY PRN
Qty: 90 TABLET | Refills: 3 | Status: SHIPPED | OUTPATIENT
Start: 2021-09-16 | End: 2022-04-11

## 2021-10-20 ENCOUNTER — TELEPHONE (OUTPATIENT)
Dept: FAMILY MEDICINE | Facility: CLINIC | Age: 66
End: 2021-10-20

## 2021-12-21 ENCOUNTER — PATIENT OUTREACH (OUTPATIENT)
Dept: ADMINISTRATIVE | Facility: HOSPITAL | Age: 66
End: 2021-12-21
Payer: MEDICAID

## 2021-12-21 DIAGNOSIS — F41.9 ANXIETY: ICD-10-CM

## 2021-12-21 DIAGNOSIS — F43.10 PTSD (POST-TRAUMATIC STRESS DISORDER): ICD-10-CM

## 2021-12-22 RX ORDER — DIAZEPAM 5 MG/1
5 TABLET ORAL EVERY 6 HOURS PRN
Qty: 120 TABLET | Refills: 2 | Status: SHIPPED | OUTPATIENT
Start: 2021-12-22 | End: 2022-04-13 | Stop reason: SDUPTHER

## 2021-12-29 ENCOUNTER — PATIENT OUTREACH (OUTPATIENT)
Dept: ADMINISTRATIVE | Facility: OTHER | Age: 66
End: 2021-12-29
Payer: MEDICAID

## 2022-03-18 ENCOUNTER — PATIENT OUTREACH (OUTPATIENT)
Dept: ADMINISTRATIVE | Facility: HOSPITAL | Age: 67
End: 2022-03-18
Payer: MEDICARE

## 2022-03-18 ENCOUNTER — PATIENT MESSAGE (OUTPATIENT)
Dept: ADMINISTRATIVE | Facility: HOSPITAL | Age: 67
End: 2022-03-18
Payer: MEDICARE

## 2022-03-18 NOTE — LETTER
March 25, 2022    Fauzia Cornelius  [address]  [place] [address]             [facility]  [address]  [place] [address]  Phone: [phone] Dear Mrs. Cornelius:    We have tried to reach you by mychart unsuccessfully.  [facility] is committed to your overall health.  To help you get the most out of each of your visits, we will review your information to make sure you are up to date on all of your recommended tests and or procedures.       Your [facility] team has found that you may be due for:     Health Maintenance Due   Topic    TETANUS VACCINE     High Dose Statin     Colorectal Cancer Screening     LDCT Lung Screen     Shingles Vaccine (2 of 2)    Mammogram     COVID-19 Vaccine (3 - Booster for Moderna series)    Influenza Vaccine (1)         Future Appointments   Date Time Provider Department Center   4/1/2022 10:20 AM Edmund Bueno MD [facility]         If you have had any of the above done at another facility, please bring the records or information with you so that your record at [facility] will be complete and up to date.     If you have not had any of these tests or procedures done recently and would like to complete this testing before your appointment with Edmund Bueno MD please call [phone] or send a message through your [facility] portal to your provider's office.      If you are currently taking medication, please bring it with you to your appointment for review.     Also, if you have any type of Advanced Directives, please bring them with you to your office visit so we may scan them into your chart.          Thanks,      Edmund Bueno MD and your [facility] Team

## 2022-03-21 ENCOUNTER — PATIENT MESSAGE (OUTPATIENT)
Dept: ADMINISTRATIVE | Facility: HOSPITAL | Age: 67
End: 2022-03-21
Payer: MEDICARE

## 2022-04-11 DIAGNOSIS — L29.9 ITCHING: ICD-10-CM

## 2022-04-11 RX ORDER — HYDROXYZINE HYDROCHLORIDE 25 MG/1
TABLET, FILM COATED ORAL
Qty: 90 TABLET | Refills: 3 | Status: SHIPPED | OUTPATIENT
Start: 2022-04-11 | End: 2023-07-26 | Stop reason: SDUPTHER

## 2022-04-13 ENCOUNTER — LAB VISIT (OUTPATIENT)
Dept: LAB | Facility: HOSPITAL | Age: 67
End: 2022-04-13
Attending: INTERNAL MEDICINE
Payer: MEDICARE

## 2022-04-13 ENCOUNTER — OFFICE VISIT (OUTPATIENT)
Dept: FAMILY MEDICINE | Facility: CLINIC | Age: 67
End: 2022-04-13
Payer: MEDICARE

## 2022-04-13 VITALS
DIASTOLIC BLOOD PRESSURE: 86 MMHG | HEART RATE: 95 BPM | OXYGEN SATURATION: 98 % | HEIGHT: 67 IN | WEIGHT: 204.13 LBS | BODY MASS INDEX: 32.04 KG/M2 | SYSTOLIC BLOOD PRESSURE: 136 MMHG

## 2022-04-13 DIAGNOSIS — M81.6 LOCALIZED OSTEOPOROSIS WITHOUT CURRENT PATHOLOGICAL FRACTURE: ICD-10-CM

## 2022-04-13 DIAGNOSIS — F41.9 ANXIETY: Primary | ICD-10-CM

## 2022-04-13 DIAGNOSIS — F10.10 ALCOHOL ABUSE: ICD-10-CM

## 2022-04-13 DIAGNOSIS — E78.00 ELEVATED LDL CHOLESTEROL LEVEL: ICD-10-CM

## 2022-04-13 DIAGNOSIS — G47.01 INSOMNIA DUE TO MEDICAL CONDITION: ICD-10-CM

## 2022-04-13 DIAGNOSIS — K31.83 ACHLORHYDRIA: ICD-10-CM

## 2022-04-13 DIAGNOSIS — M46.96 UNSPECIFIED INFLAMMATORY SPONDYLOPATHY, LUMBAR REGION: ICD-10-CM

## 2022-04-13 DIAGNOSIS — F43.10 PTSD (POST-TRAUMATIC STRESS DISORDER): ICD-10-CM

## 2022-04-13 DIAGNOSIS — G40.409 OTHER GENERALIZED EPILEPSY, NOT INTRACTABLE, WITHOUT STATUS EPILEPTICUS: ICD-10-CM

## 2022-04-13 DIAGNOSIS — R63.5 WEIGHT GAIN: ICD-10-CM

## 2022-04-13 DIAGNOSIS — R53.83 FATIGUE, UNSPECIFIED TYPE: ICD-10-CM

## 2022-04-13 DIAGNOSIS — I71.40 ABDOMINAL AORTIC ANEURYSM (AAA) WITHOUT RUPTURE: ICD-10-CM

## 2022-04-13 PROBLEM — G40.909 NONINTRACTABLE EPILEPSY WITHOUT STATUS EPILEPTICUS: Status: ACTIVE | Noted: 2022-04-13

## 2022-04-13 LAB
ALBUMIN SERPL BCP-MCNC: 3.3 G/DL (ref 3.5–5.2)
ALP SERPL-CCNC: 96 U/L (ref 55–135)
ALT SERPL W/O P-5'-P-CCNC: 85 U/L (ref 10–44)
ANION GAP SERPL CALC-SCNC: 11 MMOL/L (ref 8–16)
AST SERPL-CCNC: 95 U/L (ref 10–40)
BASOPHILS # BLD AUTO: 0.08 K/UL (ref 0–0.2)
BASOPHILS NFR BLD: 0.9 % (ref 0–1.9)
BILIRUB SERPL-MCNC: 0.5 MG/DL (ref 0.1–1)
BUN SERPL-MCNC: 12 MG/DL (ref 8–23)
CALCIUM SERPL-MCNC: 9.5 MG/DL (ref 8.7–10.5)
CHLORIDE SERPL-SCNC: 107 MMOL/L (ref 95–110)
CHOLEST SERPL-MCNC: 252 MG/DL (ref 120–199)
CHOLEST/HDLC SERPL: 3.5 {RATIO} (ref 2–5)
CO2 SERPL-SCNC: 24 MMOL/L (ref 23–29)
CREAT SERPL-MCNC: 1 MG/DL (ref 0.5–1.4)
DIFFERENTIAL METHOD: ABNORMAL
EOSINOPHIL # BLD AUTO: 0.4 K/UL (ref 0–0.5)
EOSINOPHIL NFR BLD: 4.9 % (ref 0–8)
ERYTHROCYTE [DISTWIDTH] IN BLOOD BY AUTOMATED COUNT: 13.2 % (ref 11.5–14.5)
EST. GFR  (AFRICAN AMERICAN): >60 ML/MIN/1.73 M^2
EST. GFR  (NON AFRICAN AMERICAN): 58.8 ML/MIN/1.73 M^2
GLUCOSE SERPL-MCNC: 115 MG/DL (ref 70–110)
HCT VFR BLD AUTO: 44.6 % (ref 37–48.5)
HDLC SERPL-MCNC: 71 MG/DL (ref 40–75)
HDLC SERPL: 28.2 % (ref 20–50)
HGB BLD-MCNC: 15 G/DL (ref 12–16)
IMM GRANULOCYTES # BLD AUTO: 0.02 K/UL (ref 0–0.04)
IMM GRANULOCYTES NFR BLD AUTO: 0.2 % (ref 0–0.5)
LDLC SERPL CALC-MCNC: 128.8 MG/DL (ref 63–159)
LYMPHOCYTES # BLD AUTO: 1.6 K/UL (ref 1–4.8)
LYMPHOCYTES NFR BLD: 18.3 % (ref 18–48)
MCH RBC QN AUTO: 34.6 PG (ref 27–31)
MCHC RBC AUTO-ENTMCNC: 33.6 G/DL (ref 32–36)
MCV RBC AUTO: 103 FL (ref 82–98)
MONOCYTES # BLD AUTO: 0.7 K/UL (ref 0.3–1)
MONOCYTES NFR BLD: 7.7 % (ref 4–15)
NEUTROPHILS # BLD AUTO: 5.8 K/UL (ref 1.8–7.7)
NEUTROPHILS NFR BLD: 68 % (ref 38–73)
NONHDLC SERPL-MCNC: 181 MG/DL
NRBC BLD-RTO: 0 /100 WBC
PLATELET # BLD AUTO: 192 K/UL (ref 150–450)
PMV BLD AUTO: 10.8 FL (ref 9.2–12.9)
POTASSIUM SERPL-SCNC: 3.7 MMOL/L (ref 3.5–5.1)
PROT SERPL-MCNC: 6.7 G/DL (ref 6–8.4)
RBC # BLD AUTO: 4.34 M/UL (ref 4–5.4)
SODIUM SERPL-SCNC: 142 MMOL/L (ref 136–145)
T4 FREE SERPL-MCNC: 0.83 NG/DL (ref 0.71–1.51)
TRIGL SERPL-MCNC: 261 MG/DL (ref 30–150)
TSH SERPL DL<=0.005 MIU/L-ACNC: 2.75 UIU/ML (ref 0.4–4)
VIT B12 SERPL-MCNC: 198 PG/ML (ref 210–950)
WBC # BLD AUTO: 8.49 K/UL (ref 3.9–12.7)

## 2022-04-13 PROCEDURE — 1126F AMNT PAIN NOTED NONE PRSNT: CPT | Mod: CPTII,S$GLB,, | Performed by: INTERNAL MEDICINE

## 2022-04-13 PROCEDURE — 3075F PR MOST RECENT SYSTOLIC BLOOD PRESS GE 130-139MM HG: ICD-10-PCS | Mod: CPTII,S$GLB,, | Performed by: INTERNAL MEDICINE

## 2022-04-13 PROCEDURE — 3008F BODY MASS INDEX DOCD: CPT | Mod: CPTII,S$GLB,, | Performed by: INTERNAL MEDICINE

## 2022-04-13 PROCEDURE — 1159F MED LIST DOCD IN RCRD: CPT | Mod: CPTII,S$GLB,, | Performed by: INTERNAL MEDICINE

## 2022-04-13 PROCEDURE — 3079F DIAST BP 80-89 MM HG: CPT | Mod: CPTII,S$GLB,, | Performed by: INTERNAL MEDICINE

## 2022-04-13 PROCEDURE — 3075F SYST BP GE 130 - 139MM HG: CPT | Mod: CPTII,S$GLB,, | Performed by: INTERNAL MEDICINE

## 2022-04-13 PROCEDURE — 1160F PR REVIEW ALL MEDS BY PRESCRIBER/CLIN PHARMACIST DOCUMENTED: ICD-10-PCS | Mod: CPTII,S$GLB,, | Performed by: INTERNAL MEDICINE

## 2022-04-13 PROCEDURE — 3008F PR BODY MASS INDEX (BMI) DOCUMENTED: ICD-10-PCS | Mod: CPTII,S$GLB,, | Performed by: INTERNAL MEDICINE

## 2022-04-13 PROCEDURE — 1101F PR PT FALLS ASSESS DOC 0-1 FALLS W/OUT INJ PAST YR: ICD-10-PCS | Mod: CPTII,S$GLB,, | Performed by: INTERNAL MEDICINE

## 2022-04-13 PROCEDURE — 99999 PR PBB SHADOW E&M-EST. PATIENT-LVL IV: ICD-10-PCS | Mod: PBBFAC,,, | Performed by: INTERNAL MEDICINE

## 2022-04-13 PROCEDURE — 82607 VITAMIN B-12: CPT | Performed by: INTERNAL MEDICINE

## 2022-04-13 PROCEDURE — 36415 COLL VENOUS BLD VENIPUNCTURE: CPT | Mod: PO | Performed by: INTERNAL MEDICINE

## 2022-04-13 PROCEDURE — 80053 COMPREHEN METABOLIC PANEL: CPT | Performed by: INTERNAL MEDICINE

## 2022-04-13 PROCEDURE — 85025 COMPLETE CBC W/AUTO DIFF WBC: CPT | Performed by: INTERNAL MEDICINE

## 2022-04-13 PROCEDURE — 99214 OFFICE O/P EST MOD 30 MIN: CPT | Mod: S$GLB,,, | Performed by: INTERNAL MEDICINE

## 2022-04-13 PROCEDURE — 99499 RISK ADDL DX/OHS AUDIT: ICD-10-PCS | Mod: S$GLB,,, | Performed by: INTERNAL MEDICINE

## 2022-04-13 PROCEDURE — 80061 LIPID PANEL: CPT | Performed by: INTERNAL MEDICINE

## 2022-04-13 PROCEDURE — 84443 ASSAY THYROID STIM HORMONE: CPT | Performed by: INTERNAL MEDICINE

## 2022-04-13 PROCEDURE — 1126F PR PAIN SEVERITY QUANTIFIED, NO PAIN PRESENT: ICD-10-PCS | Mod: CPTII,S$GLB,, | Performed by: INTERNAL MEDICINE

## 2022-04-13 PROCEDURE — 84439 ASSAY OF FREE THYROXINE: CPT | Performed by: INTERNAL MEDICINE

## 2022-04-13 PROCEDURE — 1160F RVW MEDS BY RX/DR IN RCRD: CPT | Mod: CPTII,S$GLB,, | Performed by: INTERNAL MEDICINE

## 2022-04-13 PROCEDURE — 3288F PR FALLS RISK ASSESSMENT DOCUMENTED: ICD-10-PCS | Mod: CPTII,S$GLB,, | Performed by: INTERNAL MEDICINE

## 2022-04-13 PROCEDURE — 1101F PT FALLS ASSESS-DOCD LE1/YR: CPT | Mod: CPTII,S$GLB,, | Performed by: INTERNAL MEDICINE

## 2022-04-13 PROCEDURE — 99999 PR PBB SHADOW E&M-EST. PATIENT-LVL IV: CPT | Mod: PBBFAC,,, | Performed by: INTERNAL MEDICINE

## 2022-04-13 PROCEDURE — 3079F PR MOST RECENT DIASTOLIC BLOOD PRESSURE 80-89 MM HG: ICD-10-PCS | Mod: CPTII,S$GLB,, | Performed by: INTERNAL MEDICINE

## 2022-04-13 PROCEDURE — 99499 UNLISTED E&M SERVICE: CPT | Mod: S$GLB,,, | Performed by: INTERNAL MEDICINE

## 2022-04-13 PROCEDURE — 3288F FALL RISK ASSESSMENT DOCD: CPT | Mod: CPTII,S$GLB,, | Performed by: INTERNAL MEDICINE

## 2022-04-13 PROCEDURE — 1159F PR MEDICATION LIST DOCUMENTED IN MEDICAL RECORD: ICD-10-PCS | Mod: CPTII,S$GLB,, | Performed by: INTERNAL MEDICINE

## 2022-04-13 PROCEDURE — 99214 PR OFFICE/OUTPT VISIT, EST, LEVL IV, 30-39 MIN: ICD-10-PCS | Mod: S$GLB,,, | Performed by: INTERNAL MEDICINE

## 2022-04-13 RX ORDER — DIAZEPAM 5 MG/1
5 TABLET ORAL EVERY 6 HOURS PRN
Qty: 120 TABLET | Refills: 2 | Status: SHIPPED | OUTPATIENT
Start: 2022-04-13 | End: 2022-08-08 | Stop reason: SDUPTHER

## 2022-04-13 RX ORDER — TEMAZEPAM 30 MG/1
30 CAPSULE ORAL NIGHTLY PRN
Qty: 30 CAPSULE | Refills: 5 | Status: SHIPPED | OUTPATIENT
Start: 2022-04-13 | End: 2022-09-16 | Stop reason: SDUPTHER

## 2022-04-13 NOTE — PROGRESS NOTES
Patient ID: Fauzia Cornelius     Chief Complaint:   Chief Complaint   Patient presents with    wellness exam        HPI:  Patient presents today requesting help for a few issues.  It seems that she contracted COVID-19 after our last office visit in September but has recovered.  She then got on occur on a few months ago.  She really think she has long COVID syndrome as evidence by extreme fatigue and I am appt to believe her.  I did recommend formalized physical therapy in order to shorten the symptoms but she cannot at this time due to transportation issues.  She does have access to a sidewalk at the place she is living now so I really do recommend she take at least 30 minutes to walk outside at a good pace starting once daily and increasing it to twice daily over the next few weeks.  One other issue is what I believe an exacerbation of her anxiety that she self medicates with alcohol use.  It has been going on for some time though it is decreasing and I fully support her weaning off gradually over the next few weeks.  We openly discussed her attending alcoholics anonymous meetings because in her own words she has no support system and this is a good way to get a sponsor and have like minute people around her to support her at least once a day.  Transportation may also be an issue but she is familiar with the program and think she can get a ride when she makes the initial visit.  She desperately requests referral to Psychiatry to help with her various mental issues and I will try to push that through his best I can but I warned her that they are in high demand right now so it may take some time.  I also told her about the new psych facilities in [place] that can see her on an urgent basis. She also notices that her personal hygiene has not been too good lately due to fatigue/ anhedonia.     Review of Systems   Constitutional: Negative.    HENT: Negative.    Eyes: Negative.    Respiratory: Negative.   "  Cardiovascular: Negative.    Gastrointestinal: Negative.    Endocrine: Negative.    Genitourinary: Negative.    Musculoskeletal: Negative.    Skin: Negative.    Allergic/Immunologic: Negative.    Neurological: Negative.    Hematological: Negative.    Psychiatric/Behavioral: Positive for dysphoric mood. The patient is nervous/anxious.           Objective:      Physical Exam   Physical Exam  Vitals and nursing note reviewed.   Constitutional:       Appearance: Normal appearance. She is well-developed.   HENT:      Head: Normocephalic and atraumatic.      Nose: Nose normal.   Eyes:      Extraocular Movements: Extraocular movements intact.      Conjunctiva/sclera: Conjunctivae normal.      Pupils: Pupils are equal, round, and reactive to light.   Cardiovascular:      Rate and Rhythm: Normal rate and regular rhythm.      Pulses: Normal pulses.      Heart sounds: Normal heart sounds.   Pulmonary:      Effort: Pulmonary effort is normal.      Breath sounds: Normal breath sounds.   Abdominal:      General: Bowel sounds are normal.      Palpations: Abdomen is soft.   Musculoskeletal:         General: Normal range of motion.      Cervical back: Normal range of motion and neck supple.   Skin:     General: Skin is warm and dry.      Capillary Refill: Capillary refill takes less than 2 seconds.   Neurological:      General: No focal deficit present.      Mental Status: She is alert and oriented to person, place, and time.   Psychiatric:         Mood and Affect: Mood normal.         Behavior: Behavior normal.         Thought Content: Thought content normal.         Judgment: Judgment normal.            Vitals:   Vitals:    04/13/22 1523   BP: 136/86   Pulse: 95   SpO2: 98%   Weight: 92.6 kg (204 lb 2.3 oz)   Height: 5' 7" (1.702 m)          Current Outpatient Medications:     brimonidine 0.1% (ALPHAGAN P) 0.1 % Drop, Alphagan P 0.1 % eye drops, Disp: , Rfl:     dorzolamide (TRUSOPT) 2 % ophthalmic solution, INSTILL ONE DROP " IN EACH EYE THREE TIMES DAILY, Disp: , Rfl:     hydrOXYzine HCL (ATARAX) 25 MG tablet, TAKE 1 TABLET BY MOUTH THREE TIMES DAILY AS NEEDED FOR ITCHING, Disp: 90 tablet, Rfl: 3    ketorolac (TORADOL) 10 mg tablet, Take 1 tablet (10 mg total) by mouth every 6 (six) hours as needed for Pain., Disp: 30 tablet, Rfl: 3    latanoprost 0.005 % ophthalmic solution, put ONE drop IN EACH EYE EVERY DAY, Disp: , Rfl: 11    temazepam (RESTORIL) 30 mg capsule, Take 1 capsule (30 mg total) by mouth nightly as needed for Insomnia., Disp: 30 capsule, Rfl: 5    timolol maleate 0.5% (TIMOPTIC) 0.5 % Drop, PUT ONE DROP IN EACH EYE TWICE DAILY, Disp: , Rfl: 4    albuterol (VENTOLIN HFA) 90 mcg/actuation inhaler, Inhale 2 puffs into the lungs every 6 (six) hours as needed for Wheezing. Rescue, Disp: 18 g, Rfl: 3    alendronate (FOSAMAX) 70 MG tablet, Take 1 tablet (70 mg total) by mouth every 7 days., Disp: 12 tablet, Rfl: 3    diazePAM (VALIUM) 5 MG tablet, Take 1 tablet (5 mg total) by mouth every 6 (six) hours as needed for Anxiety., Disp: 120 tablet, Rfl: 2   Assessment:       Patient Active Problem List    Diagnosis Date Noted    Unspecified inflammatory spondylopathy, lumbar region 04/13/2022    Alcohol abuse 04/13/2022    Nonintractable epilepsy without status epilepticus 04/13/2022    Itching 09/16/2021    Weight gain 09/16/2021    Elevated LDL cholesterol level 09/16/2021    Family history of thyroid cancer 09/16/2021    Reactive depression 01/13/2021    Anxiety 06/11/2020    Localized osteoporosis without current pathological fracture 06/11/2020    Insomnia due to medical condition 11/27/2019    Glaucoma     Lumbago without sciatica     PTSD (post-traumatic stress disorder)     Seizures     Abdominal aortic aneurysm (AAA) without rupture           Plan:       Fauzia Cornelius  was seen today for follow-up and may need lab work.    Diagnoses and all orders for this visit:    Fauzia was seen today for wellness  exam.    Diagnoses and all orders for this visit:    Anxiety  -     Ambulatory referral/consult to Psychiatry; Future  -     diazePAM (VALIUM) 5 MG tablet; Take 1 tablet (5 mg total) by mouth every 6 (six) hours as needed for Anxiety.    Unspecified inflammatory spondylopathy, lumbar region  Monitor    Other generalized epilepsy, not intractable, without status epilepticus  Controlled    Abdominal aortic aneurysm (AAA) without rupture  Monitor    Elevated LDL cholesterol level  Monitor     Localized osteoporosis without current pathological fracture  Monitor    Alcohol abuse  Go to [employer]     PTSD (post-traumatic stress disorder)  -     diazePAM (VALIUM) 5 MG tablet; Take 1 tablet (5 mg total) by mouth every 6 (six) hours as needed for Anxiety.

## 2022-04-14 ENCOUNTER — PATIENT MESSAGE (OUTPATIENT)
Dept: PSYCHIATRY | Facility: CLINIC | Age: 67
End: 2022-04-14
Payer: MEDICARE

## 2022-04-25 ENCOUNTER — TELEPHONE (OUTPATIENT)
Dept: PSYCHIATRY | Facility: CLINIC | Age: 67
End: 2022-04-25
Payer: MEDICARE

## 2022-05-09 ENCOUNTER — PATIENT MESSAGE (OUTPATIENT)
Dept: SMOKING CESSATION | Facility: CLINIC | Age: 67
End: 2022-05-09
Payer: MEDICARE

## 2022-05-16 ENCOUNTER — TELEPHONE (OUTPATIENT)
Dept: FAMILY MEDICINE | Facility: CLINIC | Age: 67
End: 2022-05-16
Payer: MEDICARE

## 2022-05-16 NOTE — TELEPHONE ENCOUNTER
----- Message from Lindsey Ho sent at 5/16/2022  2:25 PM CDT -----  Contact: daughter  Type: Needs Medical Advice  Who Called:  ameya Aguirre  Best Call Back Number: [phone]  Additional Information: patients daughter is requesting a call back regarding her moms upcoming appt.

## 2022-05-18 ENCOUNTER — HOSPITAL ENCOUNTER (OUTPATIENT)
Dept: RADIOLOGY | Facility: HOSPITAL | Age: 67
Discharge: HOME | End: 2022-05-18
Attending: INTERNAL MEDICINE
Payer: MEDICARE

## 2022-05-18 DIAGNOSIS — I71.40 ABDOMINAL AORTIC ANEURYSM (AAA) WITHOUT RUPTURE: ICD-10-CM

## 2022-05-18 DIAGNOSIS — Z80.8 FAMILY HISTORY OF THYROID CANCER: ICD-10-CM

## 2022-05-18 PROCEDURE — 76775 US ABDOMINAL AORTA: ICD-10-PCS | Mod: 26,,, | Performed by: RADIOLOGY

## 2022-05-18 PROCEDURE — 76536 US EXAM OF HEAD AND NECK: CPT | Mod: 26,,, | Performed by: RADIOLOGY

## 2022-05-18 PROCEDURE — 76536 US SOFT TISSUE HEAD NECK THYROID: ICD-10-PCS | Mod: 26,,, | Performed by: RADIOLOGY

## 2022-05-18 PROCEDURE — 76536 US EXAM OF HEAD AND NECK: CPT | Mod: TC,PO

## 2022-05-18 PROCEDURE — 76775 US EXAM ABDO BACK WALL LIM: CPT | Mod: TC,PO

## 2022-05-18 PROCEDURE — 76775 US EXAM ABDO BACK WALL LIM: CPT | Mod: 26,,, | Performed by: RADIOLOGY

## 2022-05-22 DIAGNOSIS — I71.40 ABDOMINAL AORTIC ANEURYSM (AAA) WITHOUT RUPTURE: Primary | ICD-10-CM

## 2022-05-24 ENCOUNTER — PATIENT MESSAGE (OUTPATIENT)
Dept: ADMINISTRATIVE | Facility: HOSPITAL | Age: 67
End: 2022-05-24
Payer: MEDICARE

## 2022-05-24 DIAGNOSIS — Z12.11 SCREENING FOR COLON CANCER: ICD-10-CM

## 2022-05-25 ENCOUNTER — PATIENT MESSAGE (OUTPATIENT)
Dept: FAMILY MEDICINE | Facility: CLINIC | Age: 67
End: 2022-05-25
Payer: MEDICARE

## 2022-05-27 ENCOUNTER — PATIENT MESSAGE (OUTPATIENT)
Dept: FAMILY MEDICINE | Facility: CLINIC | Age: 67
End: 2022-05-27
Payer: MEDICARE

## 2022-05-30 ENCOUNTER — PATIENT MESSAGE (OUTPATIENT)
Dept: FAMILY MEDICINE | Facility: CLINIC | Age: 67
End: 2022-05-30
Payer: MEDICARE

## 2022-05-31 ENCOUNTER — PATIENT MESSAGE (OUTPATIENT)
Dept: FAMILY MEDICINE | Facility: CLINIC | Age: 67
End: 2022-05-31
Payer: MEDICARE

## 2022-05-31 ENCOUNTER — PATIENT MESSAGE (OUTPATIENT)
Dept: ADMINISTRATIVE | Facility: HOSPITAL | Age: 67
End: 2022-05-31
Payer: MEDICARE

## 2022-07-05 ENCOUNTER — PATIENT MESSAGE (OUTPATIENT)
Dept: ADMINISTRATIVE | Facility: HOSPITAL | Age: 67
End: 2022-07-05
Payer: MEDICARE

## 2022-07-05 ENCOUNTER — PATIENT OUTREACH (OUTPATIENT)
Dept: ADMINISTRATIVE | Facility: HOSPITAL | Age: 67
End: 2022-07-05
Payer: MEDICARE

## 2022-07-05 NOTE — LETTER
July 13, 2022    Fauzia Cornelius  [address]  [place] [address]             [facility]  [address]  [place] [address]  Phone: [phone] Dear Mrs. Cornelius:    We have tried to reach you by mychart unsuccessfully.    [facility] is committed to your overall health.  To help you get the most out of each of your visits, we will review your information to make sure you are up to date on all of your recommended tests and or procedures.       Your [facility] team has found that you may be due for:     Health Maintenance Due   Topic    TETANUS VACCINE     High Dose Statin     Colorectal Cancer Screening     LDCT Lung Screen     Pneumococcal Vaccines (Age 65+) (2 - PCV)    Shingles Vaccine (2 of 2)    Mammogram     COVID-19 Vaccine (3 - Booster for Moderna series)         If you have had any of the above done at another facility, please bring the records or information with you so that your record at [facility] will be complete and up to date.     If you have not had any of these tests or procedures done recently and would like to complete this testing before your appointment with Edmund Bueno MD please call [phone] or send a message through your [facility] portal to your provider's office.      If you are currently taking medication, please bring it with you to your appointment for review.     Also, if you have any type of Advanced Directives, please bring them with you to your office visit so we may scan them into your chart.          Thanks,      Edmund Bueno MD and your [facility] Team

## 2022-07-05 NOTE — PROGRESS NOTES
Pre-visit Health Maintenance Review 2022  Care Everywhere updates requested and reviewed.  Immunizations reconciled. Media reports reviewed.  Duplicate HM overrides and  orders removed.  Overdue HM topic chart audit and/or requested.  Overdue lab testing linked to upcoming lab appointments if applies.    Health Maintenance Due   Topic Date Due    TETANUS VACCINE  Never done    High Dose Statin  Never done    Colorectal Cancer Screening  Never done    LDCT Lung Screen  Never done    Pneumococcal Vaccines (Age 65+) (2 - PCV) 10/29/2019    Shingles Vaccine (2 of 2) 2020    Mammogram  2021    COVID-19 Vaccine (3 - Booster for Moderna series) 2021

## 2022-07-18 ENCOUNTER — OFFICE VISIT (OUTPATIENT)
Dept: FAMILY MEDICINE | Facility: CLINIC | Age: 67
End: 2022-07-18
Payer: MEDICARE

## 2022-07-18 VITALS
OXYGEN SATURATION: 96 % | WEIGHT: 204 LBS | BODY MASS INDEX: 32.02 KG/M2 | SYSTOLIC BLOOD PRESSURE: 132 MMHG | HEART RATE: 84 BPM | HEIGHT: 67 IN | DIASTOLIC BLOOD PRESSURE: 78 MMHG

## 2022-07-18 DIAGNOSIS — F41.9 ANXIETY: Primary | ICD-10-CM

## 2022-07-18 DIAGNOSIS — F32.9 REACTIVE DEPRESSION: ICD-10-CM

## 2022-07-18 PROCEDURE — 3075F PR MOST RECENT SYSTOLIC BLOOD PRESS GE 130-139MM HG: ICD-10-PCS | Mod: CPTII,S$GLB,, | Performed by: INTERNAL MEDICINE

## 2022-07-18 PROCEDURE — 1160F RVW MEDS BY RX/DR IN RCRD: CPT | Mod: CPTII,S$GLB,, | Performed by: INTERNAL MEDICINE

## 2022-07-18 PROCEDURE — 3008F BODY MASS INDEX DOCD: CPT | Mod: CPTII,S$GLB,, | Performed by: INTERNAL MEDICINE

## 2022-07-18 PROCEDURE — 99214 OFFICE O/P EST MOD 30 MIN: CPT | Mod: S$GLB,,, | Performed by: INTERNAL MEDICINE

## 2022-07-18 PROCEDURE — 3288F PR FALLS RISK ASSESSMENT DOCUMENTED: ICD-10-PCS | Mod: CPTII,S$GLB,, | Performed by: INTERNAL MEDICINE

## 2022-07-18 PROCEDURE — 1101F PT FALLS ASSESS-DOCD LE1/YR: CPT | Mod: CPTII,S$GLB,, | Performed by: INTERNAL MEDICINE

## 2022-07-18 PROCEDURE — 3288F FALL RISK ASSESSMENT DOCD: CPT | Mod: CPTII,S$GLB,, | Performed by: INTERNAL MEDICINE

## 2022-07-18 PROCEDURE — 1159F PR MEDICATION LIST DOCUMENTED IN MEDICAL RECORD: ICD-10-PCS | Mod: CPTII,S$GLB,, | Performed by: INTERNAL MEDICINE

## 2022-07-18 PROCEDURE — 99214 PR OFFICE/OUTPT VISIT, EST, LEVL IV, 30-39 MIN: ICD-10-PCS | Mod: S$GLB,,, | Performed by: INTERNAL MEDICINE

## 2022-07-18 PROCEDURE — 1160F PR REVIEW ALL MEDS BY PRESCRIBER/CLIN PHARMACIST DOCUMENTED: ICD-10-PCS | Mod: CPTII,S$GLB,, | Performed by: INTERNAL MEDICINE

## 2022-07-18 PROCEDURE — 3078F PR MOST RECENT DIASTOLIC BLOOD PRESSURE < 80 MM HG: ICD-10-PCS | Mod: CPTII,S$GLB,, | Performed by: INTERNAL MEDICINE

## 2022-07-18 PROCEDURE — 1126F AMNT PAIN NOTED NONE PRSNT: CPT | Mod: CPTII,S$GLB,, | Performed by: INTERNAL MEDICINE

## 2022-07-18 PROCEDURE — 99999 PR PBB SHADOW E&M-EST. PATIENT-LVL IV: CPT | Mod: PBBFAC,,, | Performed by: INTERNAL MEDICINE

## 2022-07-18 PROCEDURE — 3078F DIAST BP <80 MM HG: CPT | Mod: CPTII,S$GLB,, | Performed by: INTERNAL MEDICINE

## 2022-07-18 PROCEDURE — 1159F MED LIST DOCD IN RCRD: CPT | Mod: CPTII,S$GLB,, | Performed by: INTERNAL MEDICINE

## 2022-07-18 PROCEDURE — 99999 PR PBB SHADOW E&M-EST. PATIENT-LVL IV: ICD-10-PCS | Mod: PBBFAC,,, | Performed by: INTERNAL MEDICINE

## 2022-07-18 PROCEDURE — 1126F PR PAIN SEVERITY QUANTIFIED, NO PAIN PRESENT: ICD-10-PCS | Mod: CPTII,S$GLB,, | Performed by: INTERNAL MEDICINE

## 2022-07-18 PROCEDURE — 3075F SYST BP GE 130 - 139MM HG: CPT | Mod: CPTII,S$GLB,, | Performed by: INTERNAL MEDICINE

## 2022-07-18 PROCEDURE — 1101F PR PT FALLS ASSESS DOC 0-1 FALLS W/OUT INJ PAST YR: ICD-10-PCS | Mod: CPTII,S$GLB,, | Performed by: INTERNAL MEDICINE

## 2022-07-18 PROCEDURE — 3008F PR BODY MASS INDEX (BMI) DOCUMENTED: ICD-10-PCS | Mod: CPTII,S$GLB,, | Performed by: INTERNAL MEDICINE

## 2022-07-18 RX ORDER — FLUOXETINE HYDROCHLORIDE 20 MG/1
20 CAPSULE ORAL DAILY
Qty: 30 CAPSULE | Refills: 11 | Status: SHIPPED | OUTPATIENT
Start: 2022-07-18 | End: 2023-05-22

## 2022-07-18 NOTE — PROGRESS NOTES
Patient ID: Fauzia Cornelius     Chief Complaint:   Chief Complaint   Patient presents with    wellness exam    Fatigue    Depression    Anxiety        HPI: Follow up for depression and anxiety that have not improved since I last saw her. She does continue to drink to calm herself down and she does take Valium at night to help her sleep. She is ready to try an anti-depressant. She was previously prescribed Prozac and Trintellix, but she's not sure she alejandro took them. She declines Paxil and Zoloft. She relates difficulties since moving to the [facility], getting Delta and than probably getting Omicron, her son passing away and her father passing away 2 weeks ago. She only has 1 brother that she can talk to. She seems to be alone. I will refer her to Psychology for talk therapy and Social Work to help with community resources.     Review of Systems   Constitutional: Negative.  Negative for activity change and unexpected weight change.   HENT: Positive for rhinorrhea and trouble swallowing. Negative for hearing loss.    Eyes: Positive for visual disturbance. Negative for discharge.   Respiratory: Positive for wheezing. Negative for chest tightness.    Cardiovascular: Negative.  Negative for chest pain and palpitations.   Gastrointestinal: Positive for diarrhea and vomiting. Negative for blood in stool and constipation.   Endocrine: Negative.  Negative for polydipsia and polyuria.   Genitourinary: Negative.  Negative for difficulty urinating, dysuria, hematuria and menstrual problem.   Musculoskeletal: Positive for arthralgias. Negative for joint swelling and neck pain.   Skin: Negative.    Allergic/Immunologic: Negative.    Neurological: Positive for weakness and headaches.   Hematological: Negative.    Psychiatric/Behavioral: Positive for confusion and dysphoric mood.          Objective:      Physical Exam   Physical Exam  Vitals and nursing note reviewed.   Constitutional:       Appearance: Normal appearance. She  "is well-developed. She is obese.   HENT:      Head: Normocephalic and atraumatic.      Nose: Nose normal.   Eyes:      Extraocular Movements: Extraocular movements intact.      Conjunctiva/sclera: Conjunctivae normal.      Pupils: Pupils are equal, round, and reactive to light.   Cardiovascular:      Rate and Rhythm: Normal rate and regular rhythm.      Pulses: Normal pulses.      Heart sounds: Normal heart sounds.   Pulmonary:      Effort: Pulmonary effort is normal.      Breath sounds: Normal breath sounds.   Abdominal:      General: Bowel sounds are normal.      Palpations: Abdomen is soft.   Musculoskeletal:         General: Normal range of motion.      Cervical back: Normal range of motion and neck supple.   Skin:     General: Skin is warm and dry.      Capillary Refill: Capillary refill takes less than 2 seconds.   Neurological:      General: No focal deficit present.      Mental Status: She is alert and oriented to person, place, and time.   Psychiatric:         Mood and Affect: Mood normal.         Behavior: Behavior normal.         Thought Content: Thought content normal.         Judgment: Judgment normal.            Vitals:   Vitals:    07/18/22 1610   BP: 132/78   Pulse: 84   SpO2: 96%   Weight: 92.5 kg (204 lb)   Height: 5' 7" (1.702 m)          Current Outpatient Medications:     brimonidine 0.1% (ALPHAGAN P) 0.1 % Drop, Alphagan P 0.1 % eye drops, Disp: , Rfl:     diazePAM (VALIUM) 5 MG tablet, Take 1 tablet (5 mg total) by mouth every 6 (six) hours as needed for Anxiety., Disp: 120 tablet, Rfl: 2    dorzolamide (TRUSOPT) 2 % ophthalmic solution, INSTILL ONE DROP IN EACH EYE THREE TIMES DAILY, Disp: , Rfl:     hydrOXYzine HCL (ATARAX) 25 MG tablet, TAKE 1 TABLET BY MOUTH THREE TIMES DAILY AS NEEDED FOR ITCHING, Disp: 90 tablet, Rfl: 3    ketorolac (TORADOL) 10 mg tablet, Take 1 tablet (10 mg total) by mouth every 6 (six) hours as needed for Pain., Disp: 30 tablet, Rfl: 3    latanoprost 0.005 % " ophthalmic solution, put ONE drop IN EACH EYE EVERY DAY, Disp: , Rfl: 11    temazepam (RESTORIL) 30 mg capsule, Take 1 capsule (30 mg total) by mouth nightly as needed for Insomnia., Disp: 30 capsule, Rfl: 5    timolol maleate 0.5% (TIMOPTIC) 0.5 % Drop, PUT ONE DROP IN EACH EYE TWICE DAILY, Disp: , Rfl: 4    albuterol (VENTOLIN HFA) 90 mcg/actuation inhaler, Inhale 2 puffs into the lungs every 6 (six) hours as needed for Wheezing. Rescue, Disp: 18 g, Rfl: 3    alendronate (FOSAMAX) 70 MG tablet, Take 1 tablet (70 mg total) by mouth every 7 days., Disp: 12 tablet, Rfl: 3    FLUoxetine 20 MG capsule, Take 1 capsule (20 mg total) by mouth once daily., Disp: 30 capsule, Rfl: 11   Assessment:       Patient Active Problem List    Diagnosis Date Noted    Unspecified inflammatory spondylopathy, lumbar region 04/13/2022    Alcohol abuse 04/13/2022    Nonintractable epilepsy without status epilepticus 04/13/2022    Itching 09/16/2021    Weight gain 09/16/2021    Elevated LDL cholesterol level 09/16/2021    Family history of thyroid cancer 09/16/2021    Reactive depression 01/13/2021    Anxiety 06/11/2020    Localized osteoporosis without current pathological fracture 06/11/2020    Insomnia due to medical condition 11/27/2019    Glaucoma     Lumbago without sciatica     PTSD (post-traumatic stress disorder)     Seizures     Abdominal aortic aneurysm (AAA) without rupture           Plan:       Fauzia Cornelius  was seen today for follow-up and may need lab work.    Diagnoses and all orders for this visit:    Fauzia was seen today for wellness exam, fatigue, depression and anxiety.    Diagnoses and all orders for this visit:    Anxiety  -     Ambulatory referral/consult to Social Work; Future  -     Ambulatory referral/consult to Psychology; Future  -     FLUoxetine 20 MG capsule; Take 1 capsule (20 mg total) by mouth once daily.    Reactive depression  -     Ambulatory referral/consult to Social Work;  Future  -     Ambulatory referral/consult to Psychology; Future  -     FLUoxetine 20 MG capsule; Take 1 capsule (20 mg total) by mouth once daily.

## 2022-07-19 ENCOUNTER — OUTPATIENT CASE MANAGEMENT (OUTPATIENT)
Dept: ADMINISTRATIVE | Facility: OTHER | Age: 67
End: 2022-07-19
Payer: MEDICARE

## 2022-07-21 ENCOUNTER — PATIENT MESSAGE (OUTPATIENT)
Dept: ADMINISTRATIVE | Facility: OTHER | Age: 67
End: 2022-07-21
Payer: MEDICARE

## 2022-07-21 ENCOUNTER — TELEPHONE (OUTPATIENT)
Dept: PSYCHIATRY | Facility: CLINIC | Age: 67
End: 2022-07-21
Payer: MEDICARE

## 2022-07-21 NOTE — PROGRESS NOTES
2nd attempt to complete Social Work Assessment; LCSW left a message requesting a return call and sent a message to the patient portal with contact information requesting a return call.

## 2022-07-21 NOTE — TELEPHONE ENCOUNTER
Scheduled new patient appointment with Dr. Dickerson for 8/9/22 at 2:30 PM.  Patient agreed to appointment date and time.

## 2022-07-22 NOTE — PROGRESS NOTES
Unable to reach pt to complete assessment x 3 attempts. PCP pool notified of same and LCSW will close case.

## 2022-08-08 ENCOUNTER — PATIENT MESSAGE (OUTPATIENT)
Dept: FAMILY MEDICINE | Facility: CLINIC | Age: 67
End: 2022-08-08
Payer: MEDICARE

## 2022-08-08 ENCOUNTER — PATIENT MESSAGE (OUTPATIENT)
Dept: PSYCHIATRY | Facility: CLINIC | Age: 67
End: 2022-08-08
Payer: MEDICARE

## 2022-08-08 DIAGNOSIS — F41.9 ANXIETY: ICD-10-CM

## 2022-08-08 DIAGNOSIS — F43.10 PTSD (POST-TRAUMATIC STRESS DISORDER): ICD-10-CM

## 2022-08-08 NOTE — TELEPHONE ENCOUNTER
No new care gaps identified.  [facility] Embedded Care Gaps. Reference number: [id number]. 8/08/2022   5:14:51 PM CDT

## 2022-08-09 ENCOUNTER — TELEPHONE (OUTPATIENT)
Dept: PSYCHIATRY | Facility: CLINIC | Age: 67
End: 2022-08-09
Payer: MEDICARE

## 2022-08-09 NOTE — TELEPHONE ENCOUNTER
Called pt to schedule no answer LVM (left clinic contact info) and sent [facility] message to call

## 2022-08-10 RX ORDER — DIAZEPAM 5 MG/1
5 TABLET ORAL EVERY 6 HOURS PRN
Qty: 120 TABLET | Refills: 2 | Status: SHIPPED | OUTPATIENT
Start: 2022-08-10 | End: 2022-09-16 | Stop reason: SDUPTHER

## 2022-08-24 ENCOUNTER — PATIENT MESSAGE (OUTPATIENT)
Dept: ADMINISTRATIVE | Facility: HOSPITAL | Age: 67
End: 2022-08-24
Payer: MEDICARE

## 2022-09-14 ENCOUNTER — PATIENT OUTREACH (OUTPATIENT)
Dept: ADMINISTRATIVE | Facility: OTHER | Age: 67
End: 2022-09-14

## 2022-09-14 NOTE — TELEPHONE ENCOUNTER
----- Message from Patsy Riggins sent at 9/14/2022  1:06 PM CDT -----  Regarding: refill  Can the clinic reply in [facility]:       Please refill the medication(s) listed below. Please call the patient when the prescription(s) is ready for  at this phone number [phone] (home) BREONNA WALTER [[id number]]          Medication #1 brimonidine 0.1% (ALPHAGAN P) 0.1 % Drop    Medication #2 timolol maleate 0.5% (TIMOPTIC) 0.5 % Drop      Preferred Pharmacy:   [id number] - [address] [address]  Phone: [phone] Fax: [phone]

## 2022-09-15 RX ORDER — BRIMONIDINE TARTRATE 1 MG/ML
SOLUTION/ DROPS OPHTHALMIC
OUTPATIENT
Start: 2022-09-15

## 2022-09-15 RX ORDER — TIMOLOL MALEATE 5 MG/ML
SOLUTION/ DROPS OPHTHALMIC
Refills: 4 | OUTPATIENT
Start: 2022-09-15

## 2022-09-16 ENCOUNTER — PATIENT MESSAGE (OUTPATIENT)
Dept: FAMILY MEDICINE | Facility: CLINIC | Age: 67
End: 2022-09-16
Payer: MEDICARE

## 2022-09-16 DIAGNOSIS — G47.01 INSOMNIA DUE TO MEDICAL CONDITION: ICD-10-CM

## 2022-09-16 DIAGNOSIS — F41.9 ANXIETY: ICD-10-CM

## 2022-09-16 DIAGNOSIS — F43.10 PTSD (POST-TRAUMATIC STRESS DISORDER): ICD-10-CM

## 2022-09-16 DIAGNOSIS — H40.9 GLAUCOMA, UNSPECIFIED GLAUCOMA TYPE, UNSPECIFIED LATERALITY: Primary | ICD-10-CM

## 2022-09-16 NOTE — TELEPHONE ENCOUNTER
No new care gaps identified.  [facility] Embedded Care Gaps. Reference number: [id number]. 9/16/2022   11:02:15 AM CDT

## 2022-09-18 RX ORDER — TEMAZEPAM 30 MG/1
30 CAPSULE ORAL NIGHTLY PRN
Qty: 30 CAPSULE | Refills: 0 | Status: SHIPPED | OUTPATIENT
Start: 2022-09-18 | End: 2023-05-18 | Stop reason: SDUPTHER

## 2022-09-18 RX ORDER — DIAZEPAM 5 MG/1
5 TABLET ORAL EVERY 6 HOURS PRN
Qty: 30 TABLET | Refills: 0 | Status: SHIPPED | OUTPATIENT
Start: 2022-09-18 | End: 2022-12-13 | Stop reason: SDUPTHER

## 2022-09-19 RX ORDER — TIMOLOL MALEATE 5 MG/ML
SOLUTION/ DROPS OPHTHALMIC
Refills: 4 | Status: CANCELLED | OUTPATIENT
Start: 2022-09-19

## 2022-09-19 RX ORDER — BRIMONIDINE TARTRATE 1 MG/ML
SOLUTION/ DROPS OPHTHALMIC
Status: CANCELLED | OUTPATIENT
Start: 2022-09-19

## 2022-09-19 RX ORDER — DORZOLAMIDE HCL 20 MG/ML
SOLUTION/ DROPS OPHTHALMIC
Status: CANCELLED | OUTPATIENT
Start: 2022-09-19

## 2022-09-19 NOTE — TELEPHONE ENCOUNTER
----- Message from Edmund Bueno MD sent at 9/17/2022  2:25 PM CDT -----  Contact: pt at [phone]  I'm not sure what pain Patient is referring to nor what pain med she's requesting. Please reach out to her.     ----- Message -----  From: Francisca Silver  Sent: 9/16/2022  12:55 PM CDT  To: Edmund Bueno MD    Type: Needs Medical Advice  Who Called:  pt  Symptoms (please be specific):  pain  How long has patient had these symptoms:  ongoing  Pharmacy name and phone #:    [facility] #[id number] - [place] - [address] [facility] [address]  [facility] [place] [place] [address]  Phone: [phone] Fax: [phone]  Best Call Back Number: [phone]  Additional Information: Pt is requesting a rush on her medication.  Please call back to advise

## 2022-09-19 NOTE — TELEPHONE ENCOUNTER
Spoke to pt. She is not in pain. She requesting a refill on her eye drops. She is completley out.

## 2022-09-20 ENCOUNTER — PATIENT MESSAGE (OUTPATIENT)
Dept: FAMILY MEDICINE | Facility: CLINIC | Age: 67
End: 2022-09-20
Payer: MEDICARE

## 2022-09-20 ENCOUNTER — TELEPHONE (OUTPATIENT)
Dept: OPHTHALMOLOGY | Facility: CLINIC | Age: 67
End: 2022-09-20
Payer: MEDICARE

## 2022-09-20 NOTE — TELEPHONE ENCOUNTER
----- Message from María White sent at 9/20/2022  3:56 PM CDT -----  Contact: Patient  Type:  Needs Medical Advice    Who Called:  Patient     Would the patient rather a call back or a response via [facility]?  Call    Best Call Back Number:  [phone] (home)     Additional Information:  Patient needs to speak to the nurse because she is saying none of her medications have been called in and she has been calling for weeks now     Please call to advise

## 2022-09-22 ENCOUNTER — OFFICE VISIT (OUTPATIENT)
Dept: PSYCHIATRY | Facility: CLINIC | Age: 67
End: 2022-09-22
Payer: MEDICARE

## 2022-09-22 ENCOUNTER — TELEPHONE (OUTPATIENT)
Dept: OPHTHALMOLOGY | Facility: CLINIC | Age: 67
End: 2022-09-22
Payer: MEDICARE

## 2022-09-22 ENCOUNTER — OFFICE VISIT (OUTPATIENT)
Dept: OPTOMETRY | Facility: CLINIC | Age: 67
End: 2022-09-22
Payer: MEDICARE

## 2022-09-22 DIAGNOSIS — H53.002 AMBLYOPIA, LEFT: ICD-10-CM

## 2022-09-22 DIAGNOSIS — F33.2 SEVERE EPISODE OF RECURRENT MAJOR DEPRESSIVE DISORDER, WITHOUT PSYCHOTIC FEATURES: ICD-10-CM

## 2022-09-22 DIAGNOSIS — F10.10 ALCOHOL USE DISORDER, MILD, ABUSE: ICD-10-CM

## 2022-09-22 DIAGNOSIS — F41.1 GAD (GENERALIZED ANXIETY DISORDER): ICD-10-CM

## 2022-09-22 DIAGNOSIS — Z96.1 PSEUDOPHAKIA OF BOTH EYES: ICD-10-CM

## 2022-09-22 DIAGNOSIS — H52.7 REFRACTIVE ERROR: ICD-10-CM

## 2022-09-22 DIAGNOSIS — H40.1133 PRIMARY OPEN ANGLE GLAUCOMA (POAG) OF BOTH EYES, SEVERE STAGE: Primary | ICD-10-CM

## 2022-09-22 PROCEDURE — 92002 INTRM OPH EXAM NEW PATIENT: CPT | Mod: S$GLB,,, | Performed by: OPTOMETRIST

## 2022-09-22 PROCEDURE — 1126F AMNT PAIN NOTED NONE PRSNT: CPT | Mod: CPTII,S$GLB,, | Performed by: OPTOMETRIST

## 2022-09-22 PROCEDURE — 92015 DETERMINE REFRACTIVE STATE: CPT | Mod: S$GLB,,, | Performed by: OPTOMETRIST

## 2022-09-22 PROCEDURE — 1159F MED LIST DOCD IN RCRD: CPT | Mod: CPTII,S$GLB,, | Performed by: OPTOMETRIST

## 2022-09-22 PROCEDURE — 3288F FALL RISK ASSESSMENT DOCD: CPT | Mod: CPTII,S$GLB,, | Performed by: OPTOMETRIST

## 2022-09-22 PROCEDURE — 92015 PR REFRACTION: ICD-10-PCS | Mod: S$GLB,,, | Performed by: OPTOMETRIST

## 2022-09-22 PROCEDURE — 1101F PT FALLS ASSESS-DOCD LE1/YR: CPT | Mod: CPTII,S$GLB,, | Performed by: OPTOMETRIST

## 2022-09-22 PROCEDURE — 90791 PR PSYCHIATRIC DIAGNOSTIC EVALUATION: ICD-10-PCS | Mod: S$GLB,,, | Performed by: PSYCHOLOGIST

## 2022-09-22 PROCEDURE — 99999 PR PBB SHADOW E&M-EST. PATIENT-LVL III: CPT | Mod: PBBFAC,,, | Performed by: OPTOMETRIST

## 2022-09-22 PROCEDURE — 1160F PR REVIEW ALL MEDS BY PRESCRIBER/CLIN PHARMACIST DOCUMENTED: ICD-10-PCS | Mod: CPTII,S$GLB,, | Performed by: OPTOMETRIST

## 2022-09-22 PROCEDURE — 3288F PR FALLS RISK ASSESSMENT DOCUMENTED: ICD-10-PCS | Mod: CPTII,S$GLB,, | Performed by: OPTOMETRIST

## 2022-09-22 PROCEDURE — 99999 PR PBB SHADOW E&M-EST. PATIENT-LVL I: ICD-10-PCS | Mod: PBBFAC,,, | Performed by: PSYCHOLOGIST

## 2022-09-22 PROCEDURE — 99999 PR PBB SHADOW E&M-EST. PATIENT-LVL I: CPT | Mod: PBBFAC,,, | Performed by: PSYCHOLOGIST

## 2022-09-22 PROCEDURE — 1159F PR MEDICATION LIST DOCUMENTED IN MEDICAL RECORD: ICD-10-PCS | Mod: CPTII,S$GLB,, | Performed by: OPTOMETRIST

## 2022-09-22 PROCEDURE — 1101F PR PT FALLS ASSESS DOC 0-1 FALLS W/OUT INJ PAST YR: ICD-10-PCS | Mod: CPTII,S$GLB,, | Performed by: OPTOMETRIST

## 2022-09-22 PROCEDURE — 1160F RVW MEDS BY RX/DR IN RCRD: CPT | Mod: CPTII,S$GLB,, | Performed by: OPTOMETRIST

## 2022-09-22 PROCEDURE — 1126F PR PAIN SEVERITY QUANTIFIED, NO PAIN PRESENT: ICD-10-PCS | Mod: CPTII,S$GLB,, | Performed by: OPTOMETRIST

## 2022-09-22 PROCEDURE — 92002 PR EYE EXAM, NEW PATIENT,INTERMED: ICD-10-PCS | Mod: S$GLB,,, | Performed by: OPTOMETRIST

## 2022-09-22 PROCEDURE — 90791 PSYCH DIAGNOSTIC EVALUATION: CPT | Mod: S$GLB,,, | Performed by: PSYCHOLOGIST

## 2022-09-22 PROCEDURE — 99999 PR PBB SHADOW E&M-EST. PATIENT-LVL III: ICD-10-PCS | Mod: PBBFAC,,, | Performed by: OPTOMETRIST

## 2022-09-22 RX ORDER — BRIMONIDINE TARTRATE 1 MG/ML
1 SOLUTION/ DROPS OPHTHALMIC 3 TIMES DAILY
Qty: 15 ML | Refills: 3 | Status: SHIPPED | OUTPATIENT
Start: 2022-09-22 | End: 2022-09-26 | Stop reason: ALTCHOICE

## 2022-09-22 RX ORDER — DORZOLAMIDE HYDROCHLORIDE AND TIMOLOL MALEATE 20; 5 MG/ML; MG/ML
1 SOLUTION/ DROPS OPHTHALMIC 3 TIMES DAILY
Qty: 10 ML | Refills: 3 | Status: SHIPPED | OUTPATIENT
Start: 2022-09-22 | End: 2023-01-04 | Stop reason: SDUPTHER

## 2022-09-22 NOTE — TELEPHONE ENCOUNTER
----- Message from Latosha Ordonez sent at 9/22/2022  2:21 PM CDT -----  MRN:  [id number]    Pt needs glaucoma eval
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
## 2022-09-22 NOTE — PROGRESS NOTES
HPI    New pt here for glaucoma DLS -unknown by Dr. Warner     Pt states she is out of most of her glaucoma gtts. Doesn't remember the   last time she saw Dr. Warner. Doesn't want to go back to him. Pt states   her va started to decrease 3-4 days ago. Pt denies pain, floaters and fol.     Denies DM and HTN. Pt using latanoprost QHS OU, dorz/susan TID OU and   brimonidine TID OU. Is running low on dorz/susan and has been out of   brimonidine for a long time. Good on latanoprost supply.  Wear hard contacts, uses plunger to remove.  Last edited by Bairon Douglass, OD on 9/22/2022  1:35 PM.            Assessment /Plan     For exam results, see Encounter Report.    Primary open angle glaucoma (POAG) of both eyes, severe stage    Pseudophakia of both eyes    Amblyopia, left    Refractive error    Other orders  -     brimonidine 0.1% (ALPHAGAN P) 0.1 % Drop; Place 1 drop into both eyes 3 (three) times daily.  Dispense: 15 mL; Refill: 3  -     dorzolamide-timolol 2-0.5% (COSOPT) 22.3-6.8 mg/mL ophthalmic solution; Place 1 drop into both eyes 3 (three) times daily.  Dispense: 10 mL; Refill: 3      Restart all drops. Brimonidine tid ou, cosopt tid ou and Latanaprost qhs OU, prev pt of [facility] in [place] and doc at [facility], pt ran out recently of brimonidine, has tube left eye, needs lower iop right eye, RTC with Dr. Taylor for glaucoma eval.  Monitor condition. Patient to report any changes. RTC 1 year recheck.   Since birth  Spectacle Rx given, discussed different options for glasses. Pt also has hard contacts.

## 2022-09-26 RX ORDER — BRIMONIDINE TARTRATE 1.5 MG/ML
1 SOLUTION/ DROPS OPHTHALMIC 3 TIMES DAILY
Qty: 15 ML | Refills: 4 | Status: SHIPPED | OUTPATIENT
Start: 2022-09-26 | End: 2023-09-29 | Stop reason: SDUPTHER

## 2022-09-26 NOTE — PROGRESS NOTES
Assessment /Plan     For exam results, see Encounter Report.    There are no diagnoses linked to this encounter.      Adjusted alphagan to point one 5 percent per pharmacy request

## 2022-09-27 ENCOUNTER — TELEPHONE (OUTPATIENT)
Dept: PSYCHIATRY | Facility: CLINIC | Age: 67
End: 2022-09-27
Payer: MEDICARE

## 2022-09-27 NOTE — TELEPHONE ENCOUNTER
Spoke with patient by phone.  Patient notes she is still trying to decide which behavioral health treatment she would like to engage in and plans to make calls this week.  Patient denied suicidal and homicidal ideation, plan and intent.

## 2022-10-03 ENCOUNTER — PATIENT MESSAGE (OUTPATIENT)
Dept: ADMINISTRATIVE | Facility: HOSPITAL | Age: 67
End: 2022-10-03
Payer: MEDICARE

## 2022-10-10 ENCOUNTER — PATIENT MESSAGE (OUTPATIENT)
Dept: ADMINISTRATIVE | Facility: HOSPITAL | Age: 67
End: 2022-10-10
Payer: MEDICARE

## 2022-10-10 ENCOUNTER — PATIENT MESSAGE (OUTPATIENT)
Dept: PSYCHIATRY | Facility: CLINIC | Age: 67
End: 2022-10-10
Payer: MEDICARE

## 2022-10-10 ENCOUNTER — TELEPHONE (OUTPATIENT)
Dept: PSYCHIATRY | Facility: CLINIC | Age: 67
End: 2022-10-10
Payer: MEDICARE

## 2022-10-10 NOTE — TELEPHONE ENCOUNTER
Tried calling pt about scheduling no answer LVM (left clinic contact info) and chart message has been sent     ----- Message from Radha Dickerson PsyD sent at 10/10/2022 10:07 AM CDT -----  I had discussed with patient various treatment options however she was not ready to make a decision when we met.  Could you please call patient and schedule her for a follow-up with me this week.  If there are no availabilities I can add her in. Audio only visit is fine.  Thanks!

## 2022-10-10 NOTE — PROGRESS NOTES
CHW - Initial Contact    This Community Health Worker updated the Social Determinant of Health questionnaire with patient via telephone today.    Pt identified barriers of most importance are: none at this time   Referrals to community agencies completed with patient/caregiver consent outside of [facility] include:  no  Referrals were put through [facility] - no:   Support and Services: none at this time  Other information discussed the patient needs / wants help with: pt states that she might needs transportation due to her car being filled with mole.   Follow up required: yes  Follow-up Outreach - Due: 10/27/2022

## 2022-10-27 ENCOUNTER — PATIENT OUTREACH (OUTPATIENT)
Dept: ADMINISTRATIVE | Facility: OTHER | Age: 67
End: 2022-10-27
Payer: MEDICARE

## 2022-10-27 NOTE — PROGRESS NOTES
CHW - Follow Up    This Community Health Worker completed a follow up visit with patient via telephone today.  Pt/Caregiver reported: that she is confused on what insurances she has and her car is still filled with mold and she doesn't want to ride the bus  Community Health Worker provided: pt with a f/u date to check on pt well being.  Follow up required: yes  Follow-up Outreach - Due: 11/17/2022

## 2022-11-02 ENCOUNTER — TELEPHONE (OUTPATIENT)
Dept: PSYCHIATRY | Facility: CLINIC | Age: 67
End: 2022-11-02
Payer: MEDICARE

## 2022-11-14 ENCOUNTER — PATIENT MESSAGE (OUTPATIENT)
Dept: FAMILY MEDICINE | Facility: CLINIC | Age: 67
End: 2022-11-14
Payer: MEDICARE

## 2022-11-17 ENCOUNTER — PATIENT OUTREACH (OUTPATIENT)
Dept: ADMINISTRATIVE | Facility: HOSPITAL | Age: 67
End: 2022-11-17
Payer: MEDICARE

## 2022-11-17 ENCOUNTER — PATIENT OUTREACH (OUTPATIENT)
Dept: ADMINISTRATIVE | Facility: OTHER | Age: 67
End: 2022-11-17
Payer: MEDICARE

## 2022-11-17 ENCOUNTER — PATIENT MESSAGE (OUTPATIENT)
Dept: ADMINISTRATIVE | Facility: HOSPITAL | Age: 67
End: 2022-11-17
Payer: MEDICARE

## 2022-11-17 NOTE — PROGRESS NOTES
CHW - Follow Up    This Community Health Worker completed a follow up visit with patient via telephone today.  Pt/Caregiver reported: that she is feeling bad due to long covid and the doctor gave her antibiotics. Pt also stated that her car is still filled with mole but her friend been taking her to the doctors appointments.  Community Health Worker provided: pt with a f/u date to check on pt well bring and health  Follow up required: yes  Follow-up Outreach - Due: 12/5/2022

## 2022-11-17 NOTE — PROGRESS NOTES
Pre-Visit Chart Review  For Appointment Scheduled on 12/1/2022    Health Maintenance Due   Topic    TETANUS VACCINE     Colorectal Cancer Screening     LDCT Lung Screen     Pneumococcal Vaccines (Age 65+) (2 - PCV)    Shingles Vaccine (2 of 2)    Mammogram     COVID-19 Vaccine (3 - Booster for Moderna series)    Influenza Vaccine (1)

## 2022-11-18 ENCOUNTER — TELEPHONE (OUTPATIENT)
Dept: FAMILY MEDICINE | Facility: CLINIC | Age: 67
End: 2022-11-18

## 2022-11-18 NOTE — TELEPHONE ENCOUNTER
----- Message from Shilpi Stovall sent at 11/17/2022  3:02 PM CST -----  .Type:  Patient Call Back    Who Called: PT       Does the patient know what this is regarding?: PT WANTS TO GET THE ANTIBODY INFUSION  FOR LONG HAMACO ROSARIO PT STATED I ASKED DID SHE WANT THE BOOSTER SHE STATED NO. SHE HAVING A HARD TIME WITH EXTREME FATIGUE TIRED CAN'T WALK WITHOUT HER HEART BEATING SO FAST FEELS LIKE SHE'S GOING TO PASS OUT AND HIT THE FLOOR SHE HAS TROUBLE EATING AS WELL PT BEGAN TO CRY PLEASE REACH OUT TO PT     Would the patient rather a call back YES     Best Call Back Number: [phone]    Additional Information: Thank You

## 2022-11-18 NOTE — TELEPHONE ENCOUNTER
Pt is having troubles from covid, requesting infusion. Having fatigue, increased heart rate when walking, and trouble eating. Please advise pt is upset.

## 2022-11-18 NOTE — TELEPHONE ENCOUNTER
Unfortunately, the antibody infusion he is not indicated for long COVID symptoms.  I would like to see her in clinic and get some labs.  In the meantime I would like her to hydrate better with water and that should improve the tachycardia.

## 2022-11-18 NOTE — TELEPHONE ENCOUNTER
Returned call to patient. She has an appointment on December 1st with Dr. Bueno. Offered an earlier appointment but she declined because of transportation. She is breathing well, not shortness of breath.

## 2022-12-01 ENCOUNTER — LAB VISIT (OUTPATIENT)
Dept: LAB | Facility: HOSPITAL | Age: 67
End: 2022-12-01
Attending: INTERNAL MEDICINE
Payer: MEDICARE

## 2022-12-01 ENCOUNTER — OFFICE VISIT (OUTPATIENT)
Dept: FAMILY MEDICINE | Facility: CLINIC | Age: 67
End: 2022-12-01
Payer: MEDICARE

## 2022-12-01 VITALS
SYSTOLIC BLOOD PRESSURE: 122 MMHG | HEIGHT: 67 IN | DIASTOLIC BLOOD PRESSURE: 62 MMHG | OXYGEN SATURATION: 96 % | HEART RATE: 82 BPM | WEIGHT: 180.31 LBS | BODY MASS INDEX: 28.3 KG/M2

## 2022-12-01 DIAGNOSIS — K31.83 ACHLORHYDRIA: ICD-10-CM

## 2022-12-01 DIAGNOSIS — R53.82 CHRONIC FATIGUE: ICD-10-CM

## 2022-12-01 DIAGNOSIS — E78.00 ELEVATED LDL CHOLESTEROL LEVEL: ICD-10-CM

## 2022-12-01 DIAGNOSIS — Z12.11 COLON CANCER SCREENING: ICD-10-CM

## 2022-12-01 DIAGNOSIS — E53.8 VITAMIN B12 DEFICIENCY: ICD-10-CM

## 2022-12-01 DIAGNOSIS — R53.82 CHRONIC FATIGUE: Primary | ICD-10-CM

## 2022-12-01 DIAGNOSIS — F10.10 ALCOHOL ABUSE: ICD-10-CM

## 2022-12-01 DIAGNOSIS — Z87.891 HISTORY OF NICOTINE DEPENDENCE: ICD-10-CM

## 2022-12-01 DIAGNOSIS — Z23 NEED FOR VACCINATION: ICD-10-CM

## 2022-12-01 LAB
ALBUMIN SERPL BCP-MCNC: 3.5 G/DL (ref 3.5–5.2)
ALP SERPL-CCNC: 96 U/L (ref 55–135)
ALT SERPL W/O P-5'-P-CCNC: 19 U/L (ref 10–44)
ANION GAP SERPL CALC-SCNC: 12 MMOL/L (ref 8–16)
AST SERPL-CCNC: 24 U/L (ref 10–40)
BILIRUB SERPL-MCNC: 0.5 MG/DL (ref 0.1–1)
BUN SERPL-MCNC: 10 MG/DL (ref 8–23)
CALCIUM SERPL-MCNC: 10.3 MG/DL (ref 8.7–10.5)
CHLORIDE SERPL-SCNC: 104 MMOL/L (ref 95–110)
CHOLEST SERPL-MCNC: 247 MG/DL (ref 120–199)
CHOLEST/HDLC SERPL: 5.4 {RATIO} (ref 2–5)
CO2 SERPL-SCNC: 23 MMOL/L (ref 23–29)
CREAT SERPL-MCNC: 0.8 MG/DL (ref 0.5–1.4)
EST. GFR  (NO RACE VARIABLE): >60 ML/MIN/1.73 M^2
GLUCOSE SERPL-MCNC: 90 MG/DL (ref 70–110)
HDLC SERPL-MCNC: 46 MG/DL (ref 40–75)
HDLC SERPL: 18.6 % (ref 20–50)
LDLC SERPL CALC-MCNC: 158.4 MG/DL (ref 63–159)
MAGNESIUM SERPL-MCNC: 1.9 MG/DL (ref 1.6–2.6)
NONHDLC SERPL-MCNC: 201 MG/DL
POTASSIUM SERPL-SCNC: 4.9 MMOL/L (ref 3.5–5.1)
PROT SERPL-MCNC: 7 G/DL (ref 6–8.4)
SODIUM SERPL-SCNC: 139 MMOL/L (ref 136–145)
T4 FREE SERPL-MCNC: 0.76 NG/DL (ref 0.71–1.51)
TRIGL SERPL-MCNC: 213 MG/DL (ref 30–150)
TSH SERPL DL<=0.005 MIU/L-ACNC: 3.9 UIU/ML (ref 0.4–4)
VIT B12 SERPL-MCNC: 285 PG/ML (ref 210–950)

## 2022-12-01 PROCEDURE — 85025 COMPLETE CBC W/AUTO DIFF WBC: CPT | Performed by: INTERNAL MEDICINE

## 2022-12-01 PROCEDURE — 82607 VITAMIN B-12: CPT | Performed by: INTERNAL MEDICINE

## 2022-12-01 PROCEDURE — 3078F PR MOST RECENT DIASTOLIC BLOOD PRESSURE < 80 MM HG: ICD-10-PCS | Mod: CPTII,S$GLB,, | Performed by: INTERNAL MEDICINE

## 2022-12-01 PROCEDURE — 91312 COVID-19, MRNA, LNP-S, BIVALENT BOOSTER, PF, 30 MCG/0.3 ML DOSE: ICD-10-PCS | Mod: S$GLB,,, | Performed by: INTERNAL MEDICINE

## 2022-12-01 PROCEDURE — 99999 PR PBB SHADOW E&M-EST. PATIENT-LVL III: ICD-10-PCS | Mod: PBBFAC,,, | Performed by: INTERNAL MEDICINE

## 2022-12-01 PROCEDURE — 99214 PR OFFICE/OUTPT VISIT, EST, LEVL IV, 30-39 MIN: ICD-10-PCS | Mod: S$GLB,,, | Performed by: INTERNAL MEDICINE

## 2022-12-01 PROCEDURE — 36415 COLL VENOUS BLD VENIPUNCTURE: CPT | Mod: PO | Performed by: INTERNAL MEDICINE

## 2022-12-01 PROCEDURE — 83735 ASSAY OF MAGNESIUM: CPT | Performed by: INTERNAL MEDICINE

## 2022-12-01 PROCEDURE — 3078F DIAST BP <80 MM HG: CPT | Mod: CPTII,S$GLB,, | Performed by: INTERNAL MEDICINE

## 2022-12-01 PROCEDURE — 3008F PR BODY MASS INDEX (BMI) DOCUMENTED: ICD-10-PCS | Mod: CPTII,S$GLB,, | Performed by: INTERNAL MEDICINE

## 2022-12-01 PROCEDURE — 99214 OFFICE O/P EST MOD 30 MIN: CPT | Mod: S$GLB,,, | Performed by: INTERNAL MEDICINE

## 2022-12-01 PROCEDURE — 3074F SYST BP LT 130 MM HG: CPT | Mod: CPTII,S$GLB,, | Performed by: INTERNAL MEDICINE

## 2022-12-01 PROCEDURE — 1159F MED LIST DOCD IN RCRD: CPT | Mod: CPTII,S$GLB,, | Performed by: INTERNAL MEDICINE

## 2022-12-01 PROCEDURE — 84443 ASSAY THYROID STIM HORMONE: CPT | Performed by: INTERNAL MEDICINE

## 2022-12-01 PROCEDURE — 1159F PR MEDICATION LIST DOCUMENTED IN MEDICAL RECORD: ICD-10-PCS | Mod: CPTII,S$GLB,, | Performed by: INTERNAL MEDICINE

## 2022-12-01 PROCEDURE — 3008F BODY MASS INDEX DOCD: CPT | Mod: CPTII,S$GLB,, | Performed by: INTERNAL MEDICINE

## 2022-12-01 PROCEDURE — 3074F PR MOST RECENT SYSTOLIC BLOOD PRESSURE < 130 MM HG: ICD-10-PCS | Mod: CPTII,S$GLB,, | Performed by: INTERNAL MEDICINE

## 2022-12-01 PROCEDURE — 3288F PR FALLS RISK ASSESSMENT DOCUMENTED: ICD-10-PCS | Mod: CPTII,S$GLB,, | Performed by: INTERNAL MEDICINE

## 2022-12-01 PROCEDURE — 1160F RVW MEDS BY RX/DR IN RCRD: CPT | Mod: CPTII,S$GLB,, | Performed by: INTERNAL MEDICINE

## 2022-12-01 PROCEDURE — 99999 PR PBB SHADOW E&M-EST. PATIENT-LVL III: CPT | Mod: PBBFAC,,, | Performed by: INTERNAL MEDICINE

## 2022-12-01 PROCEDURE — 80061 LIPID PANEL: CPT | Performed by: INTERNAL MEDICINE

## 2022-12-01 PROCEDURE — 0124A COVID-19, MRNA, LNP-S, BIVALENT BOOSTER, PF, 30 MCG/0.3 ML DOSE: CPT | Mod: PBBFAC | Performed by: INTERNAL MEDICINE

## 2022-12-01 PROCEDURE — 86376 MICROSOMAL ANTIBODY EACH: CPT | Performed by: INTERNAL MEDICINE

## 2022-12-01 PROCEDURE — 3288F FALL RISK ASSESSMENT DOCD: CPT | Mod: CPTII,S$GLB,, | Performed by: INTERNAL MEDICINE

## 2022-12-01 PROCEDURE — 91312 COVID-19, MRNA, LNP-S, BIVALENT BOOSTER, PF, 30 MCG/0.3 ML DOSE: CPT | Mod: S$GLB,,, | Performed by: INTERNAL MEDICINE

## 2022-12-01 PROCEDURE — 84439 ASSAY OF FREE THYROXINE: CPT | Performed by: INTERNAL MEDICINE

## 2022-12-01 PROCEDURE — 1100F PR PT FALLS ASSESS DOC 2+ FALLS/FALL W/INJURY/YR: ICD-10-PCS | Mod: CPTII,S$GLB,, | Performed by: INTERNAL MEDICINE

## 2022-12-01 PROCEDURE — 1100F PTFALLS ASSESS-DOCD GE2>/YR: CPT | Mod: CPTII,S$GLB,, | Performed by: INTERNAL MEDICINE

## 2022-12-01 PROCEDURE — 80053 COMPREHEN METABOLIC PANEL: CPT | Performed by: INTERNAL MEDICINE

## 2022-12-01 PROCEDURE — 1160F PR REVIEW ALL MEDS BY PRESCRIBER/CLIN PHARMACIST DOCUMENTED: ICD-10-PCS | Mod: CPTII,S$GLB,, | Performed by: INTERNAL MEDICINE

## 2022-12-01 RX ORDER — CYANOCOBALAMIN 1000 UG/ML
1000 INJECTION, SOLUTION INTRAMUSCULAR; SUBCUTANEOUS
Qty: 10 ML | Refills: 3 | Status: SHIPPED | OUTPATIENT
Start: 2022-12-01 | End: 2023-07-26 | Stop reason: SDUPTHER

## 2022-12-01 NOTE — PROGRESS NOTES
Patient ID: Fauzia Cornelius     Chief Complaint:   Chief Complaint   Patient presents with    Thyroid Problem        HPI:  Follow-up from a visit to the ER where she went for chronic fatigue that in her words has been present for about a year.  Routine workup showed hypokalemia which was replaced and urinary tract infection with E coli that was appropriately treated with Keflex.  We checked her thyroid function tests shortly before this office visit and her TSH was elevated at 7.3 but her free T4 was normal.  Ultrasound of her thyroid did show a small thyroid gland but no nodules were found.  I am not entirely convinced that her thyroid is malfunctioning some blood repeat some of those test today and repeat a potassium and also repeat a vitamin B12 level because that was historically low.  Her liver enzymes are also slightly elevated but since her trip to the ER she has decided to quit drinking alcohol and I am happy about that.  She does continue to smoke and does consent to a low-dose lung CT but transportation is an issue so we will have to stagger the bulk of our preventative maintenance tests over the course of a few months.  She is interested in both a COVID booster and a flu shot and pneumonia shot but I want to see which ones I have to give her today.  I do not like having a lot of shots at once.  She always get extra shots at her pharmacy if she so decides.  She does have a feeling of being unsteady on her feet which could be residual from the alcohol she drank or the B12 deficiency.    Review of Systems   Constitutional: Negative.    HENT: Negative.     Eyes: Negative.    Respiratory: Negative.     Cardiovascular: Negative.    Gastrointestinal: Negative.    Endocrine: Negative.    Genitourinary: Negative.    Musculoskeletal: Negative.    Skin: Negative.    Allergic/Immunologic: Negative.    Neurological:  Positive for dizziness.   Hematological: Negative.    Psychiatric/Behavioral: Negative.         "  Objective:      Physical Exam   Physical Exam  Vitals and nursing note reviewed.   Constitutional:       Appearance: Normal appearance. She is well-developed.   HENT:      Head: Normocephalic and atraumatic.      Nose: Nose normal.      Mouth/Throat:      Mouth: Mucous membranes are moist.   Eyes:      Extraocular Movements: Extraocular movements intact.      Conjunctiva/sclera: Conjunctivae normal.      Pupils: Pupils are equal, round, and reactive to light.   Cardiovascular:      Rate and Rhythm: Normal rate and regular rhythm.      Pulses: Normal pulses.      Heart sounds: Normal heart sounds.   Pulmonary:      Effort: Pulmonary effort is normal.      Comments: Decreased breath sounds bilaterally with wheezing that cleared with deep breaths.   Abdominal:      General: Bowel sounds are normal.      Palpations: Abdomen is soft.   Musculoskeletal:         General: Normal range of motion.      Cervical back: Normal range of motion and neck supple.   Skin:     General: Skin is warm and dry.      Capillary Refill: Capillary refill takes less than 2 seconds.   Neurological:      General: No focal deficit present.      Mental Status: She is alert and oriented to person, place, and time.   Psychiatric:         Mood and Affect: Mood normal.         Behavior: Behavior normal.         Thought Content: Thought content normal.         Judgment: Judgment normal.          Vitals:   Vitals:    12/01/22 1135   BP: 122/62   Pulse: 82   SpO2: 96%   Weight: 81.8 kg (180 lb 5.4 oz)   Height: 5' 7" (1.702 m)          Current Outpatient Medications:     brimonidine 0.15 % OPTH DROP (ALPHAGAN) 0.15 % ophthalmic solution, Place 1 drop into both eyes 3 (three) times daily., Disp: 15 mL, Rfl: 4    diazePAM (VALIUM) 5 MG tablet, Take 1 tablet (5 mg total) by mouth every 6 (six) hours as needed for Anxiety., Disp: 30 tablet, Rfl: 0    dorzolamide-timolol 2-0.5% (COSOPT) 22.3-6.8 mg/mL ophthalmic solution, Place 1 drop into both eyes 3 " (three) times daily., Disp: 10 mL, Rfl: 3    FLUoxetine 20 MG capsule, Take 1 capsule (20 mg total) by mouth once daily., Disp: 30 capsule, Rfl: 11    hydrOXYzine HCL (ATARAX) 25 MG tablet, TAKE 1 TABLET BY MOUTH THREE TIMES DAILY AS NEEDED FOR ITCHING, Disp: 90 tablet, Rfl: 3    ketorolac (TORADOL) 10 mg tablet, Take 1 tablet (10 mg total) by mouth every 6 (six) hours as needed for Pain., Disp: 30 tablet, Rfl: 3    latanoprost 0.005 % ophthalmic solution, put ONE drop IN EACH EYE EVERY DAY, Disp: , Rfl: 11    temazepam (RESTORIL) 30 mg capsule, Take 1 capsule (30 mg total) by mouth nightly as needed for Insomnia., Disp: 30 capsule, Rfl: 0    albuterol (VENTOLIN HFA) 90 mcg/actuation inhaler, Inhale 2 puffs into the lungs every 6 (six) hours as needed for Wheezing. Rescue (Patient not taking: Reported on 12/1/2022), Disp: 18 g, Rfl: 3    alendronate (FOSAMAX) 70 MG tablet, Take 1 tablet (70 mg total) by mouth every 7 days. (Patient not taking: Reported on 12/1/2022), Disp: 12 tablet, Rfl: 3    cyanocobalamin 1,000 mcg/mL injection, Inject 1 mL (1,000 mcg total) into the muscle every 30 days., Disp: 10 mL, Rfl: 3   Assessment:       Patient Active Problem List    Diagnosis Date Noted    Vitamin B12 deficiency 12/01/2022    Unspecified inflammatory spondylopathy, lumbar region 04/13/2022    Alcohol abuse 04/13/2022    Nonintractable epilepsy without status epilepticus 04/13/2022    Itching 09/16/2021    Weight gain 09/16/2021    Elevated LDL cholesterol level 09/16/2021    Family history of thyroid cancer 09/16/2021    Reactive depression 01/13/2021    Anxiety 06/11/2020    Localized osteoporosis without current pathological fracture 06/11/2020    Insomnia due to medical condition 11/27/2019    Glaucoma     Lumbago without sciatica     PTSD (post-traumatic stress disorder)     Seizures     Abdominal aortic aneurysm (AAA) without rupture           Plan:       Fauzia Cornelius  was seen today for  follow-up and may need lab work.    Diagnoses and all orders for this visit:    Fauzia was seen today for thyroid problem.    Diagnoses and all orders for this visit:    Chronic fatigue  -     CBC Auto Differential; Future  -     Comprehensive Metabolic Panel; Future  -     T4, Free; Future  -     TSH; Future  -     Magnesium; Future  -     Vitamin B12; Future  -     THYROID PEROXIDASE ANTIBODY; Future    Colon cancer screening  -     Fecal Immunochemical Test (iFOBT); Future    History of nicotine dependence  -     CT Chest Lung Screening Low Dose; Future    Elevated LDL cholesterol level  -     Lipid Panel; Future    Achlorhydria  -     Vitamin B12; Future    Vitamin B12 deficiency  -     cyanocobalamin 1,000 mcg/mL injection; Inject 1 mL (1,000 mcg total) into the muscle every 30 days.    Alcohol abuse

## 2022-12-02 LAB
BASOPHILS # BLD AUTO: 0.08 K/UL (ref 0–0.2)
BASOPHILS NFR BLD: 0.8 % (ref 0–1.9)
DIFFERENTIAL METHOD: ABNORMAL
EOSINOPHIL # BLD AUTO: 0.4 K/UL (ref 0–0.5)
EOSINOPHIL NFR BLD: 3.8 % (ref 0–8)
ERYTHROCYTE [DISTWIDTH] IN BLOOD BY AUTOMATED COUNT: 14.1 % (ref 11.5–14.5)
HCT VFR BLD AUTO: 44.2 % (ref 37–48.5)
HGB BLD-MCNC: 14.8 G/DL (ref 12–16)
IMM GRANULOCYTES # BLD AUTO: 0.02 K/UL (ref 0–0.04)
IMM GRANULOCYTES NFR BLD AUTO: 0.2 % (ref 0–0.5)
LYMPHOCYTES # BLD AUTO: 2.3 K/UL (ref 1–4.8)
LYMPHOCYTES NFR BLD: 23.6 % (ref 18–48)
MCH RBC QN AUTO: 41.1 PG (ref 27–31)
MCHC RBC AUTO-ENTMCNC: 33.5 G/DL (ref 32–36)
MCV RBC AUTO: 123 FL (ref 82–98)
MONOCYTES # BLD AUTO: 0.6 K/UL (ref 0.3–1)
MONOCYTES NFR BLD: 6.4 % (ref 4–15)
NEUTROPHILS # BLD AUTO: 6.4 K/UL (ref 1.8–7.7)
NEUTROPHILS NFR BLD: 65.2 % (ref 38–73)
NRBC BLD-RTO: 0 /100 WBC
PLATELET # BLD AUTO: 288 K/UL (ref 150–450)
PMV BLD AUTO: 11.3 FL (ref 9.2–12.9)
RBC # BLD AUTO: 3.6 M/UL (ref 4–5.4)
THYROPEROXIDASE IGG SERPL-ACNC: <6 IU/ML
WBC # BLD AUTO: 9.86 K/UL (ref 3.9–12.7)

## 2022-12-05 ENCOUNTER — PATIENT OUTREACH (OUTPATIENT)
Dept: ADMINISTRATIVE | Facility: OTHER | Age: 67
End: 2022-12-05
Payer: MEDICARE

## 2022-12-05 NOTE — PROGRESS NOTES
CHW - Follow Up    This Community Health Worker completed a follow up visit with patient via telephone today.  Pt/Caregiver reported: that she received the booster shot for covid and have been feeling better   Community Health Worker provided: pt with a f/u date to check on pt well being and health  Follow up required: yes  Follow-up Outreach - Due: 12/20/2022

## 2022-12-06 ENCOUNTER — TELEPHONE (OUTPATIENT)
Dept: FAMILY MEDICINE | Facility: CLINIC | Age: 67
End: 2022-12-06
Payer: MEDICARE

## 2022-12-07 NOTE — TELEPHONE ENCOUNTER
----- Message from Miguel Patterson sent at 12/6/2022  3:12 PM CST -----  Type: Patient Call Back         Who called:Pt          What is the request in detail:Pt called in regarding B12 Injection . Pt states that her insurance will not cover injections and is theirs any sub for the B12 injection ?         Can the clinic reply by [facility]? No          Would the patient rather a call back or a response via [facility]? Call back          Best call back number:[phone] (mobile)          Additional Information:           Thank You    
Pt states insurance won't cover B12 injection. Is there anything else she can try?  
She can take any OTC Sublingual Vit B12 prep- usually 1000 mcg / day.   
Spoke with pt relayed message from Dr. Bueno about Vitamin B-12. Pt verbalized understanding.  
Spontaneous, unlabored and symmetrical

## 2022-12-13 DIAGNOSIS — F43.10 PTSD (POST-TRAUMATIC STRESS DISORDER): ICD-10-CM

## 2022-12-13 DIAGNOSIS — F41.9 ANXIETY: ICD-10-CM

## 2022-12-13 NOTE — TELEPHONE ENCOUNTER
----- Message from Leah Garrett sent at 12/13/2022  2:06 PM CST -----  Contact: Patient  Type:  RX Refill Request    Who Called: Patient    Refill or New Rx: refill    RX Name and Strength: diazePAM (VALIUM) 5 MG tablet    How is the patient currently taking it? (ex. 1XDay)    Is this a 30 day or 90 day RX:    Preferred Pharmacy with phone number:   [id number] - [address] - [facility]  [address] [facility] [address]  [facility] [place] [address]  Phone: [phone] Fax: [phone]    Would the patient rather a call back or a response via [facility]? call    Best Call Back Number: [phone]    Additional Information: requesting refill; please advise

## 2022-12-13 NOTE — TELEPHONE ENCOUNTER
No new care gaps identified.  [facility] Embedded Care Gaps. Reference number: [id number]. 12/13/2022   2:16:48 PM CST

## 2022-12-13 NOTE — TELEPHONE ENCOUNTER
----- Message from Leah Tucker sent at 12/13/2022  2:04 PM CST -----  Contact: Patient  Type:  Test Results    Who Called: Patient    Name of Test (Lab/Mammo/Etc): labs    Date of Test:  12.01.22    Would the patient rather a call back or a response via [facility]? Call back    Best Call Back Number:  [phone]    Additional Information:  requesting a call back to go over lab results; please advise

## 2022-12-14 RX ORDER — DIAZEPAM 5 MG/1
5 TABLET ORAL EVERY 6 HOURS PRN
Qty: 30 TABLET | Refills: 0 | Status: SHIPPED | OUTPATIENT
Start: 2022-12-14 | End: 2023-01-04 | Stop reason: SDUPTHER

## 2022-12-15 DIAGNOSIS — E78.49 OTHER HYPERLIPIDEMIA: Primary | ICD-10-CM

## 2022-12-15 RX ORDER — ROSUVASTATIN CALCIUM 5 MG/1
5 TABLET, COATED ORAL DAILY
Qty: 90 TABLET | Refills: 3 | Status: SHIPPED | OUTPATIENT
Start: 2022-12-15 | End: 2023-05-28 | Stop reason: DRUGHIGH

## 2022-12-20 ENCOUNTER — TELEPHONE (OUTPATIENT)
Dept: FAMILY MEDICINE | Facility: CLINIC | Age: 67
End: 2022-12-20
Payer: MEDICARE

## 2022-12-20 NOTE — TELEPHONE ENCOUNTER
----- Message from Edmund Bueno MD sent at 12/15/2022  8:18 PM CST -----  Start rosuvastatin 5 mg each night and check CMP and CPK in 2-3 weeks.

## 2022-12-20 NOTE — TELEPHONE ENCOUNTER
Spoke with patient, she picked up the prescription for the statin already. She is going to call us back with a date to schedule the lab work

## 2022-12-29 ENCOUNTER — PATIENT MESSAGE (OUTPATIENT)
Dept: FAMILY MEDICINE | Facility: CLINIC | Age: 67
End: 2022-12-29
Payer: MEDICARE

## 2023-01-04 ENCOUNTER — PATIENT OUTREACH (OUTPATIENT)
Dept: ADMINISTRATIVE | Facility: OTHER | Age: 68
End: 2023-01-04
Payer: MEDICARE

## 2023-01-04 ENCOUNTER — TELEPHONE (OUTPATIENT)
Dept: OPHTHALMOLOGY | Facility: CLINIC | Age: 68
End: 2023-01-04
Payer: MEDICARE

## 2023-01-04 ENCOUNTER — PATIENT MESSAGE (OUTPATIENT)
Dept: FAMILY MEDICINE | Facility: CLINIC | Age: 68
End: 2023-01-04
Payer: MEDICARE

## 2023-01-04 ENCOUNTER — PATIENT MESSAGE (OUTPATIENT)
Dept: OPTOMETRY | Facility: CLINIC | Age: 68
End: 2023-01-04
Payer: MEDICARE

## 2023-01-04 DIAGNOSIS — F43.10 PTSD (POST-TRAUMATIC STRESS DISORDER): ICD-10-CM

## 2023-01-04 DIAGNOSIS — F41.9 ANXIETY: ICD-10-CM

## 2023-01-04 RX ORDER — DIAZEPAM 5 MG/1
5 TABLET ORAL EVERY 6 HOURS PRN
Qty: 30 TABLET | Refills: 0 | Status: SHIPPED | OUTPATIENT
Start: 2023-01-04 | End: 2023-02-03 | Stop reason: SDUPTHER

## 2023-01-04 RX ORDER — LATANOPROST 50 UG/ML
SOLUTION/ DROPS OPHTHALMIC
Qty: 2.5 ML | Refills: 11 | Status: SHIPPED | OUTPATIENT
Start: 2023-01-04

## 2023-01-04 RX ORDER — DORZOLAMIDE HYDROCHLORIDE AND TIMOLOL MALEATE 20; 5 MG/ML; MG/ML
1 SOLUTION/ DROPS OPHTHALMIC 3 TIMES DAILY
Qty: 10 ML | Refills: 3 | Status: SHIPPED | OUTPATIENT
Start: 2023-01-04 | End: 2023-11-27 | Stop reason: SDUPTHER

## 2023-01-04 NOTE — TELEPHONE ENCOUNTER
Spoke with pt at length.  She is without a vehicle at present.  Unable to get anywhere but around [facility].  States would be able to get to [facility] or [employer] driving herself if had vehicle.  She wanted to go see Dr. Taylor in [place], but she cannot get anyone to take her.  She is adamant she will not see Dr. Warner again and he is the only glaucoma person in the [facility] that she is aware of.  Advised pt needed to be seen for IOP monitoring, she says will try to get a ride yane.  Advised pt to call and let us know if she could not get in.      Spoke with Dr. Douglass.  Pt could see Dr. Carmona in [place] for glaucoma checks . Called pt and left Voicemail regarding this info.

## 2023-01-04 NOTE — PROGRESS NOTES
CHW - Case Closure    This Community Health Worker spoke to patient via telephone today.   Pt/Caregiver reported: that she doesn't need any resources at this moment.  Pt/Caregiver denied any additional needs at this time and agrees with episode closure at this time.  Provided patient with Community Health Worker's contact information and encouraged him/her to contact this Community Health Worker if additional needs arise.

## 2023-01-04 NOTE — TELEPHONE ENCOUNTER
No new care gaps identified.  [facility] Embedded Care Gaps. Reference number: [id number]. 1/04/2023   11:51:54 AM CST

## 2023-01-06 ENCOUNTER — TELEPHONE (OUTPATIENT)
Dept: OPHTHALMOLOGY | Facility: CLINIC | Age: 68
End: 2023-01-06
Payer: MEDICARE

## 2023-01-06 NOTE — TELEPHONE ENCOUNTER
"Spoke with pt by phone.  She states she did not call Dr. Carmona after last phone encounter.  She could not get a ride for appt we scheduled with Dr. Douglass.  Per Dr. Lopez she can wait to see him, if she goes to Dr. Carmona for a pressure check ASAP.  Gave pt the phone number for Dr. Carmona office again per her request.  She states she will call today.  Scheduled appt for 1/18/23 w/ Dr. Douglass-requested enough time to arrange transportation.  Advised pt we need to see if the drops are working. We need to do a pressure check and other testing to make that determination.  She can lose VA from elevated eye pressure that cannot be restored.  She keeps stating " I know, I know"   "but how am I supposed to get there without  a car?"  States she will try to arrange transportation.  MR                    ----- Message from Bairon Douglass, OD sent at 1/5/2023  2:30 PM CST -----  Next week is fine, if she can see Dr. Carmona in [place] earlier that would be better. I know she has trouble with transportation and that office may be closer  ----- Message -----  From: Yi Kwok  Sent: 1/5/2023  12:22 PM CST  To: Bairon Douglass, OD      ----- Message -----  From: Jay Farley  Sent: 1/5/2023  12:12 PM CST  To: Evonne DANIEL Staff    Who Called:BREONNA WALTER [[id number]]         What is the reqeust in detail: Requesting call back to discuss being seen for pressure check.  Pt unable to make appt today and is asking for a nurse visit sometime next week.         Can the clinic reply by [facility]? No          Best Call Back Number: [phone]           Additional Information:         "

## 2023-01-18 ENCOUNTER — PATIENT MESSAGE (OUTPATIENT)
Dept: ADMINISTRATIVE | Facility: HOSPITAL | Age: 68
End: 2023-01-18
Payer: MEDICARE

## 2023-02-01 ENCOUNTER — PATIENT MESSAGE (OUTPATIENT)
Dept: FAMILY MEDICINE | Facility: CLINIC | Age: 68
End: 2023-02-01
Payer: COMMERCIAL

## 2023-02-01 DIAGNOSIS — F41.9 ANXIETY: ICD-10-CM

## 2023-02-01 DIAGNOSIS — F43.10 PTSD (POST-TRAUMATIC STRESS DISORDER): ICD-10-CM

## 2023-02-01 RX ORDER — DIAZEPAM 5 MG/1
5 TABLET ORAL EVERY 6 HOURS PRN
Qty: 30 TABLET | Refills: 0 | Status: CANCELLED | OUTPATIENT
Start: 2023-02-01

## 2023-02-01 NOTE — TELEPHONE ENCOUNTER
No new care gaps identified.  [facility] Embedded Care Gaps. Reference number: [id number]. 2/01/2023   2:43:09 PM CST

## 2023-02-03 DIAGNOSIS — F43.10 PTSD (POST-TRAUMATIC STRESS DISORDER): ICD-10-CM

## 2023-02-03 DIAGNOSIS — F41.9 ANXIETY: ICD-10-CM

## 2023-02-03 RX ORDER — DIAZEPAM 5 MG/1
5 TABLET ORAL EVERY 6 HOURS PRN
Qty: 30 TABLET | Refills: 0 | Status: SHIPPED | OUTPATIENT
Start: 2023-02-03 | End: 2023-03-03 | Stop reason: SDUPTHER

## 2023-02-03 RX ORDER — DIAZEPAM 5 MG/1
5 TABLET ORAL EVERY 6 HOURS PRN
Qty: 30 TABLET | Refills: 0 | Status: CANCELLED | OUTPATIENT
Start: 2023-02-03

## 2023-02-03 NOTE — TELEPHONE ENCOUNTER
No new care gaps identified.  [facility] Embedded Care Gaps. Reference number: [id number]. 2/03/2023   12:33:56 PM CST

## 2023-02-03 NOTE — TELEPHONE ENCOUNTER
----- Message from Maryan Gotti sent at 2/3/2023 11:23 AM CST -----  Regarding: refill  Please refill the medication(s) listed below. Please call the patient when the prescription(s) is ready for  at this phone number     Pt would to be called at [phone]           Medication #1diazePAM (VALIUM) 5 MG tablet         Medication #2           Preferred Pharmacy:   [id number]  [address] [facility] [address] [facility] [address]  [facility]  [address] [address]  Phone: [phone] Fax: [phone]

## 2023-03-03 DIAGNOSIS — F43.10 PTSD (POST-TRAUMATIC STRESS DISORDER): ICD-10-CM

## 2023-03-03 DIAGNOSIS — F41.9 ANXIETY: ICD-10-CM

## 2023-03-03 RX ORDER — DIAZEPAM 5 MG/1
5 TABLET ORAL EVERY 6 HOURS PRN
Qty: 30 TABLET | Refills: 0 | Status: SHIPPED | OUTPATIENT
Start: 2023-03-03 | End: 2023-05-18 | Stop reason: SDUPTHER

## 2023-03-03 NOTE — TELEPHONE ENCOUNTER
Spoke to pt, pt discussed not being able to make to her appointment today, didn't know she had an appointment, advised pt scheduled for 3 mth follow up at her last appointment. Pt stated having trouble getting to her appointments, No transportation, advised pt to contact her insurance for transportation assistance, pt declined, stated that's a all day process and I don't want to be on a bus all day, offered virtual visit, pt declined, Pt stated lives in [place], discuss switching to [place] location to be closer to home, pt declined stated I can't get to [place] how I'm gone get to [place], advised it would be a closer option for you. Pt requesting one more refill on Diazepam without being seen. Advised I will send the provider a message regarding request. Pt VU. Refill pended. Please advise.

## 2023-03-03 NOTE — TELEPHONE ENCOUNTER
----- Message from Maryan Gotti sent at 3/3/2023 11:57 AM CST -----  Regarding: pt call back  Name of Who is Calling:BREONNA WALTER [[id number]]           What is the request in detail: pt call back soon as possible            Can the clinic reply by [facility]:           [facility] [phone]

## 2023-03-03 NOTE — TELEPHONE ENCOUNTER
No new care gaps identified.  [facility] Embedded Care Gaps. Reference number: [id number]. 3/03/2023   3:28:34 PM CST

## 2023-04-11 ENCOUNTER — PATIENT MESSAGE (OUTPATIENT)
Dept: ADMINISTRATIVE | Facility: HOSPITAL | Age: 68
End: 2023-04-11
Payer: COMMERCIAL

## 2023-05-17 ENCOUNTER — TELEPHONE (OUTPATIENT)
Dept: FAMILY MEDICINE | Facility: CLINIC | Age: 68
End: 2023-05-17
Payer: COMMERCIAL

## 2023-05-17 NOTE — TELEPHONE ENCOUNTER
----- Message from Angelica Ivan sent at 5/17/2023  4:51 PM CDT -----  Regarding: appointment  Contact: Elis daughter  Type:  Sooner Appointment Request    Caller is requesting a sooner appointment.  Caller declined first available appointment listed below.  Caller will not accept being placed on the waitlist and is requesting a message be sent to doctor.    Name of Caller:  Elis daughter  When is the first available appointment?    Symptoms:  sleep, back and stomach issues  Best Call Back Number:  [phone]  Additional Information:  please call patient daughter to schedule.  Thanks!

## 2023-05-17 NOTE — TELEPHONE ENCOUNTER
I have attempted to contact this patient by phone with the following results: someone answered but the call was disconnected before scheduling an appointment. Pt will need to be scheduled in a new patient slot with Dr. Arriaga and can be placed on a wait list for possible sooner appointment.

## 2023-05-18 ENCOUNTER — TELEPHONE (OUTPATIENT)
Dept: FAMILY MEDICINE | Facility: CLINIC | Age: 68
End: 2023-05-18
Payer: COMMERCIAL

## 2023-05-18 DIAGNOSIS — F41.9 ANXIETY: ICD-10-CM

## 2023-05-18 DIAGNOSIS — G47.01 INSOMNIA DUE TO MEDICAL CONDITION: ICD-10-CM

## 2023-05-18 DIAGNOSIS — F43.10 PTSD (POST-TRAUMATIC STRESS DISORDER): ICD-10-CM

## 2023-05-18 RX ORDER — TEMAZEPAM 30 MG/1
30 CAPSULE ORAL NIGHTLY PRN
Qty: 30 CAPSULE | Refills: 0 | Status: SHIPPED | OUTPATIENT
Start: 2023-05-18 | End: 2024-03-25

## 2023-05-18 NOTE — TELEPHONE ENCOUNTER
----- Message from Angelica Ivan sent at 5/17/2023  4:53 PM CDT -----  Regarding: medication  Contact: elis daughter  Type: Needs Medical Advice  Who Called:  Elis daughter  Symptoms (please be specific):  Hasn't slept in 5 days  How long has patient had these symptoms:  5 days  Pharmacy name and phone #:    [id number] [address] [facility] [address] [facility] [address]  [address] [address]  Phone: [phone] Fax: [phone]      Best Call Back Number: [phone]  Additional Information: Daughter patient hasn't slept in 5 days.  Please call daughter to advise.  Thanks!

## 2023-05-18 NOTE — TELEPHONE ENCOUNTER
No care due was identified.  [facility] Embedded Care Due Messages. Reference number: [id number].   5/18/2023 10:56:38 AM CDT

## 2023-05-18 NOTE — TELEPHONE ENCOUNTER
Spoke with daughter. Patient has been having trouble sleeping and also has some stomach discomfort and back pain. Scheduled an appointment for 05/22/23. She need a refill on the Temazepam as well.

## 2023-05-18 NOTE — TELEPHONE ENCOUNTER
----- Message from Angelica Ivan sent at 5/17/2023  4:47 PM CDT -----  Regarding: appointment  Contact: elis daughter  Type:  Sooner Appointment Request    Caller is requesting a sooner appointment.  Caller declined first available appointment listed below.  Caller will not accept being placed on the waitlist and is requesting a message be sent to doctor.    Name of Caller:  Elis hou  When is the first available appointment?  07/18/23  Symptoms:  stomach, back and sleep issues  Best Call Back Number:  [phone]  Additional Information:  Please call daughter to schedule.  Thanks!

## 2023-05-18 NOTE — TELEPHONE ENCOUNTER
----- Message from Melanie Bowen sent at 5/18/2023 11:12 AM CDT -----  Contact: Blaire/Daughter  Blaire is needing a call back in regards to scheduling an appt for the pt. Please give her a call back at [phone]

## 2023-05-18 NOTE — TELEPHONE ENCOUNTER
----- Message from Prince Oliveira sent at 5/18/2023 12:50 PM CDT -----  Type: Needs Medical Advice  Who Called:  Pt daughter  Best Call Back Number: [phone]  Additional Information: Pt is waiting for her RX to help her sleep to be sent over to the pharmacy, daughter states its not there yet, pl call bk to advise Thanks

## 2023-05-18 NOTE — TELEPHONE ENCOUNTER
No care due was identified.  [facility] Embedded Care Due Messages. Reference number: [id number].   5/18/2023 2:16:38 PM CDT

## 2023-05-19 RX ORDER — DIAZEPAM 5 MG/1
5 TABLET ORAL EVERY 6 HOURS PRN
Qty: 30 TABLET | Refills: 0 | Status: SHIPPED | OUTPATIENT
Start: 2023-05-19 | End: 2023-05-22 | Stop reason: SDUPTHER

## 2023-05-22 ENCOUNTER — OFFICE VISIT (OUTPATIENT)
Dept: FAMILY MEDICINE | Facility: CLINIC | Age: 68
End: 2023-05-22
Payer: COMMERCIAL

## 2023-05-22 ENCOUNTER — HOSPITAL ENCOUNTER (OUTPATIENT)
Dept: RADIOLOGY | Facility: HOSPITAL | Age: 68
Discharge: HOME | End: 2023-05-22
Attending: INTERNAL MEDICINE
Payer: COMMERCIAL

## 2023-05-22 VITALS
DIASTOLIC BLOOD PRESSURE: 82 MMHG | HEIGHT: 67 IN | WEIGHT: 161.5 LBS | HEART RATE: 62 BPM | SYSTOLIC BLOOD PRESSURE: 114 MMHG | OXYGEN SATURATION: 98 % | BODY MASS INDEX: 25.35 KG/M2

## 2023-05-22 DIAGNOSIS — I71.43 INFRARENAL ABDOMINAL AORTIC ANEURYSM (AAA) WITHOUT RUPTURE: ICD-10-CM

## 2023-05-22 DIAGNOSIS — Z78.0 MENOPAUSE: ICD-10-CM

## 2023-05-22 DIAGNOSIS — F43.10 PTSD (POST-TRAUMATIC STRESS DISORDER): ICD-10-CM

## 2023-05-22 DIAGNOSIS — R73.01 IMPAIRED FASTING GLUCOSE: ICD-10-CM

## 2023-05-22 DIAGNOSIS — M54.50 CHRONIC BILATERAL LOW BACK PAIN WITHOUT SCIATICA: Primary | ICD-10-CM

## 2023-05-22 DIAGNOSIS — F41.9 ANXIETY: ICD-10-CM

## 2023-05-22 DIAGNOSIS — Z12.31 BREAST CANCER SCREENING BY MAMMOGRAM: ICD-10-CM

## 2023-05-22 DIAGNOSIS — M54.50 CHRONIC BILATERAL LOW BACK PAIN WITHOUT SCIATICA: ICD-10-CM

## 2023-05-22 DIAGNOSIS — G89.29 CHRONIC BILATERAL LOW BACK PAIN WITHOUT SCIATICA: Primary | ICD-10-CM

## 2023-05-22 DIAGNOSIS — G89.29 CHRONIC BILATERAL LOW BACK PAIN WITHOUT SCIATICA: ICD-10-CM

## 2023-05-22 DIAGNOSIS — E78.49 OTHER HYPERLIPIDEMIA: ICD-10-CM

## 2023-05-22 PROBLEM — E78.00 ELEVATED LDL CHOLESTEROL LEVEL: Status: RESOLVED | Noted: 2021-09-16 | Resolved: 2023-05-22

## 2023-05-22 PROCEDURE — 1159F MED LIST DOCD IN RCRD: CPT | Mod: CPTII,S$GLB,, | Performed by: INTERNAL MEDICINE

## 2023-05-22 PROCEDURE — 1159F PR MEDICATION LIST DOCUMENTED IN MEDICAL RECORD: ICD-10-PCS | Mod: CPTII,S$GLB,, | Performed by: INTERNAL MEDICINE

## 2023-05-22 PROCEDURE — G0009 PNEUMOCOCCAL CONJUGATE VACCINE 20-VALENT: ICD-10-PCS | Mod: S$GLB,,, | Performed by: INTERNAL MEDICINE

## 2023-05-22 PROCEDURE — 3074F PR MOST RECENT SYSTOLIC BLOOD PRESSURE < 130 MM HG: ICD-10-PCS | Mod: CPTII,S$GLB,, | Performed by: INTERNAL MEDICINE

## 2023-05-22 PROCEDURE — 99214 PR OFFICE/OUTPT VISIT, EST, LEVL IV, 30-39 MIN: ICD-10-PCS | Mod: S$GLB,,, | Performed by: INTERNAL MEDICINE

## 2023-05-22 PROCEDURE — 3008F PR BODY MASS INDEX (BMI) DOCUMENTED: ICD-10-PCS | Mod: CPTII,S$GLB,, | Performed by: INTERNAL MEDICINE

## 2023-05-22 PROCEDURE — 1126F AMNT PAIN NOTED NONE PRSNT: CPT | Mod: CPTII,S$GLB,, | Performed by: INTERNAL MEDICINE

## 2023-05-22 PROCEDURE — 1101F PR PT FALLS ASSESS DOC 0-1 FALLS W/OUT INJ PAST YR: ICD-10-PCS | Mod: CPTII,S$GLB,, | Performed by: INTERNAL MEDICINE

## 2023-05-22 PROCEDURE — 1126F PR PAIN SEVERITY QUANTIFIED, NO PAIN PRESENT: ICD-10-PCS | Mod: CPTII,S$GLB,, | Performed by: INTERNAL MEDICINE

## 2023-05-22 PROCEDURE — 3079F PR MOST RECENT DIASTOLIC BLOOD PRESSURE 80-89 MM HG: ICD-10-PCS | Mod: CPTII,S$GLB,, | Performed by: INTERNAL MEDICINE

## 2023-05-22 PROCEDURE — 72110 XR LUMBAR SPINE COMPLETE 5 VIEW: ICD-10-PCS | Mod: 26,,, | Performed by: RADIOLOGY

## 2023-05-22 PROCEDURE — 1160F RVW MEDS BY RX/DR IN RCRD: CPT | Mod: CPTII,S$GLB,, | Performed by: INTERNAL MEDICINE

## 2023-05-22 PROCEDURE — 99999 PR PBB SHADOW E&M-EST. PATIENT-LVL IV: ICD-10-PCS | Mod: PBBFAC,,, | Performed by: INTERNAL MEDICINE

## 2023-05-22 PROCEDURE — 3044F PR MOST RECENT HEMOGLOBIN A1C LEVEL <7.0%: ICD-10-PCS | Mod: CPTII,S$GLB,, | Performed by: INTERNAL MEDICINE

## 2023-05-22 PROCEDURE — 90677 PCV20 VACCINE IM: CPT | Mod: S$GLB,,, | Performed by: INTERNAL MEDICINE

## 2023-05-22 PROCEDURE — 3008F BODY MASS INDEX DOCD: CPT | Mod: CPTII,S$GLB,, | Performed by: INTERNAL MEDICINE

## 2023-05-22 PROCEDURE — 3079F DIAST BP 80-89 MM HG: CPT | Mod: CPTII,S$GLB,, | Performed by: INTERNAL MEDICINE

## 2023-05-22 PROCEDURE — 3288F PR FALLS RISK ASSESSMENT DOCUMENTED: ICD-10-PCS | Mod: CPTII,S$GLB,, | Performed by: INTERNAL MEDICINE

## 2023-05-22 PROCEDURE — 3074F SYST BP LT 130 MM HG: CPT | Mod: CPTII,S$GLB,, | Performed by: INTERNAL MEDICINE

## 2023-05-22 PROCEDURE — 72110 X-RAY EXAM L-2 SPINE 4/>VWS: CPT | Mod: 26,,, | Performed by: RADIOLOGY

## 2023-05-22 PROCEDURE — 99214 OFFICE O/P EST MOD 30 MIN: CPT | Mod: S$GLB,,, | Performed by: INTERNAL MEDICINE

## 2023-05-22 PROCEDURE — 72110 X-RAY EXAM L-2 SPINE 4/>VWS: CPT | Mod: TC,FY,PO

## 2023-05-22 PROCEDURE — 1160F PR REVIEW ALL MEDS BY PRESCRIBER/CLIN PHARMACIST DOCUMENTED: ICD-10-PCS | Mod: CPTII,S$GLB,, | Performed by: INTERNAL MEDICINE

## 2023-05-22 PROCEDURE — 3288F FALL RISK ASSESSMENT DOCD: CPT | Mod: CPTII,S$GLB,, | Performed by: INTERNAL MEDICINE

## 2023-05-22 PROCEDURE — G0009 ADMIN PNEUMOCOCCAL VACCINE: HCPCS | Mod: S$GLB,,, | Performed by: INTERNAL MEDICINE

## 2023-05-22 PROCEDURE — 3044F HG A1C LEVEL LT 7.0%: CPT | Mod: CPTII,S$GLB,, | Performed by: INTERNAL MEDICINE

## 2023-05-22 PROCEDURE — 1101F PT FALLS ASSESS-DOCD LE1/YR: CPT | Mod: CPTII,S$GLB,, | Performed by: INTERNAL MEDICINE

## 2023-05-22 PROCEDURE — 90677 PNEUMOCOCCAL CONJUGATE VACCINE 20-VALENT: ICD-10-PCS | Mod: S$GLB,,, | Performed by: INTERNAL MEDICINE

## 2023-05-22 PROCEDURE — 99999 PR PBB SHADOW E&M-EST. PATIENT-LVL IV: CPT | Mod: PBBFAC,,, | Performed by: INTERNAL MEDICINE

## 2023-05-22 RX ORDER — DIAZEPAM 5 MG/1
5 TABLET ORAL EVERY 12 HOURS PRN
Qty: 60 TABLET | Refills: 0 | Status: SHIPPED | OUTPATIENT
Start: 2023-05-22 | End: 2023-07-10

## 2023-05-22 NOTE — PROGRESS NOTES
"Patient ID: Fauzia Cornelius     Chief Complaint:   Chief Complaint   Patient presents with    Follow-up        HPI:  Routine follow-up.  Since our last office visit she has decided to quit drinking alcohol and I am very proud of her for that.  She does admit to increased anxiety for various reasons.  Previously she was on fluoxetine but did not like the way it made her feel.  She is taking clonazepam at least once at night and possibly a 2nd time during the day so I think a prescription of that no more than twice a day is in order.  She did hurt her back in a move from 1 apartment to another and she does have previous surgery on her lumbar spine.  I want to check an x-ray of it today and consider physical therapy based on those findings.  Her vital signs do look good.  She is in need of a great deal of health maintenance but we will get this accomplished in a piecemeal fashion throughout the rest of the year.  Today I am going to give her a Prevnar 20 to complete her pneumonia vaccination series.  She only needs 1 more shingles shot complete that series so I would like her to get that from her local pharmacy in a month or so.  She is due for labs which I will get today as I did start rosuvastatin 5 mg at night about 6 months ago.  She is due for mammogram and a bone density scan during the summer so I have placed those orders and we will schedule them both on the same day.  She does need a repeat ultrasound of her abdominal aorta to monitor that aneurysms and she does consent to a CT scan of her lungs due to her smoking history.    Review of Systems       Anxiety  Back pain     Objective:      Physical Exam   Physical Exam       Normal    Vitals:   Vitals:    05/22/23 1355   BP: 114/82   Pulse: 62   SpO2: 98%   Weight: 73.2 kg (161 lb 7.8 oz)   Height: 5' 7" (1.702 m)          Current Outpatient Medications:     brimonidine 0.15 % OPTH DROP (ALPHAGAN) 0.15 % ophthalmic solution, Place 1 drop into both eyes 3 (three) " times daily., Disp: 15 mL, Rfl: 4    cyanocobalamin 1,000 mcg/mL injection, Inject 1 mL (1,000 mcg total) into the muscle every 30 days., Disp: 10 mL, Rfl: 3    dorzolamide-timolol 2-0.5% (COSOPT) 22.3-6.8 mg/mL ophthalmic solution, Place 1 drop into both eyes 3 (three) times daily., Disp: 10 mL, Rfl: 3    hydrOXYzine HCL (ATARAX) 25 MG tablet, TAKE 1 TABLET BY MOUTH THREE TIMES DAILY AS NEEDED FOR ITCHING, Disp: 90 tablet, Rfl: 3    ketorolac (TORADOL) 10 mg tablet, Take 1 tablet (10 mg total) by mouth every 6 (six) hours as needed for Pain., Disp: 30 tablet, Rfl: 3    latanoprost 0.005 % ophthalmic solution, put ONE drop IN EACH EYE EVERY DAY, Disp: 2.5 mL, Rfl: 11    rosuvastatin (CRESTOR) 5 MG tablet, Take 1 tablet (5 mg total) by mouth once daily., Disp: 90 tablet, Rfl: 3    temazepam (RESTORIL) 30 mg capsule, Take 1 capsule (30 mg total) by mouth nightly as needed for Insomnia., Disp: 30 capsule, Rfl: 0    albuterol (VENTOLIN HFA) 90 mcg/actuation inhaler, Inhale 2 puffs into the lungs every 6 (six) hours as needed for Wheezing. Rescue (Patient not taking: Reported on 12/1/2022), Disp: 18 g, Rfl: 3    alendronate (FOSAMAX) 70 MG tablet, Take 1 tablet (70 mg total) by mouth every 7 days. 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    Alcohol abuse 04/13/2022    Nonintractable epilepsy without status epilepticus 04/13/2022    Itching 09/16/2021    Weight gain 09/16/2021    Family history of thyroid cancer 09/16/2021    Reactive depression 01/13/2021    Anxiety 06/11/2020    Localized osteoporosis without current pathological fracture 06/11/2020    Insomnia due to medical  condition 11/27/2019    Glaucoma     Lumbago without sciatica     PTSD (post-traumatic stress disorder)     Seizures     Abdominal aortic aneurysm (AAA) without rupture           Plan:       Fauzia Cornelius  was seen today for follow-up and may need lab work.    Diagnoses and all orders for this visit:    Fauzia was seen today for follow-up.    Diagnoses and all orders for this visit:    Chronic bilateral low back pain without sciatica  -     X-Ray Lumbar Spine 5 View; Future  Consider Physical Therapy     Anxiety  -     diazePAM (VALIUM) 5 MG tablet; Take 1 tablet (5 mg total) by mouth every 12 (twelve) hours as needed for Anxiety.  Controlled with med     PTSD (post-traumatic stress disorder)  -     diazePAM (VALIUM) 5 MG tablet; Take 1 tablet (5 mg total) by mouth every 12 (twelve) hours as needed for Anxiety.  Controlled with med     Breast cancer screening by mammogram  -     Mammo Digital Screening Bilat w/ Charly; Future    Other hyperlipidemia  -     Comprehensive Metabolic Panel; Future  -     CK; Future  -     Lipid Panel; Future  Check labs      Impaired fasting glucose  -     Hemoglobin A1C; Future  Check labs      Menopause  -     DXA Bone Density Axial Skeleton 1 or more sites; Future    Infrarenal abdominal aortic aneurysm (AAA) without rupture  -     US Abdominal Aorta; Future    Other orders  -     (In Office Administered) Pneumococcal Conjugate Vaccine (20 Valent) (IM)

## 2023-05-28 DIAGNOSIS — E78.49 OTHER HYPERLIPIDEMIA: Primary | ICD-10-CM

## 2023-05-28 RX ORDER — ROSUVASTATIN CALCIUM 10 MG/1
10 TABLET, COATED ORAL NIGHTLY
Qty: 90 TABLET | Refills: 3 | Status: SHIPPED | OUTPATIENT
Start: 2023-05-28 | End: 2023-07-26 | Stop reason: SDUPTHER

## 2023-07-10 DIAGNOSIS — F41.9 ANXIETY: ICD-10-CM

## 2023-07-10 DIAGNOSIS — F43.10 PTSD (POST-TRAUMATIC STRESS DISORDER): ICD-10-CM

## 2023-07-10 RX ORDER — DIAZEPAM 5 MG/1
TABLET ORAL
Qty: 60 TABLET | Refills: 0 | Status: SHIPPED | OUTPATIENT
Start: 2023-07-10 | End: 2023-07-26

## 2023-07-10 NOTE — TELEPHONE ENCOUNTER
No care due was identified.  [facility] Embedded Care Due Messages. Reference number: [id number].   7/10/2023 12:34:02 PM CDT

## 2023-07-26 ENCOUNTER — OFFICE VISIT (OUTPATIENT)
Dept: FAMILY MEDICINE | Facility: CLINIC | Age: 68
End: 2023-07-26
Payer: COMMERCIAL

## 2023-07-26 VITALS
WEIGHT: 166.19 LBS | RESPIRATION RATE: 18 BRPM | BODY MASS INDEX: 27.69 KG/M2 | OXYGEN SATURATION: 98 % | SYSTOLIC BLOOD PRESSURE: 129 MMHG | HEIGHT: 65 IN | TEMPERATURE: 97 F | DIASTOLIC BLOOD PRESSURE: 84 MMHG | HEART RATE: 68 BPM

## 2023-07-26 DIAGNOSIS — L29.9 ITCHING: ICD-10-CM

## 2023-07-26 DIAGNOSIS — F43.10 PTSD (POST-TRAUMATIC STRESS DISORDER): ICD-10-CM

## 2023-07-26 DIAGNOSIS — R56.9 SEIZURES: ICD-10-CM

## 2023-07-26 DIAGNOSIS — F41.9 ANXIETY: ICD-10-CM

## 2023-07-26 DIAGNOSIS — Z78.0 POSTMENOPAUSAL: ICD-10-CM

## 2023-07-26 DIAGNOSIS — E78.49 OTHER HYPERLIPIDEMIA: ICD-10-CM

## 2023-07-26 DIAGNOSIS — L28.0 NEURODERMATITIS: ICD-10-CM

## 2023-07-26 DIAGNOSIS — I71.40 ABDOMINAL AORTIC ANEURYSM (AAA) WITHOUT RUPTURE, UNSPECIFIED PART: ICD-10-CM

## 2023-07-26 DIAGNOSIS — H40.9 GLAUCOMA OF BOTH EYES, UNSPECIFIED GLAUCOMA TYPE: Primary | ICD-10-CM

## 2023-07-26 DIAGNOSIS — F51.04 PSYCHOPHYSIOLOGICAL INSOMNIA: ICD-10-CM

## 2023-07-26 DIAGNOSIS — Z12.11 COLON CANCER SCREENING: ICD-10-CM

## 2023-07-26 DIAGNOSIS — E53.8 VITAMIN B12 DEFICIENCY: ICD-10-CM

## 2023-07-26 DIAGNOSIS — Z12.31 ENCOUNTER FOR SCREENING MAMMOGRAM FOR MALIGNANT NEOPLASM OF BREAST: ICD-10-CM

## 2023-07-26 PROBLEM — R63.5 WEIGHT GAIN: Status: RESOLVED | Noted: 2021-09-16 | Resolved: 2023-07-26

## 2023-07-26 PROBLEM — G47.00 INSOMNIA: Status: ACTIVE | Noted: 2019-11-27

## 2023-07-26 PROCEDURE — 1160F PR REVIEW ALL MEDS BY PRESCRIBER/CLIN PHARMACIST DOCUMENTED: ICD-10-PCS | Mod: CPTII,S$GLB,, | Performed by: STUDENT IN AN ORGANIZED HEALTH CARE EDUCATION/TRAINING PROGRAM

## 2023-07-26 PROCEDURE — 1126F PR PAIN SEVERITY QUANTIFIED, NO PAIN PRESENT: ICD-10-PCS | Mod: CPTII,S$GLB,, | Performed by: STUDENT IN AN ORGANIZED HEALTH CARE EDUCATION/TRAINING PROGRAM

## 2023-07-26 PROCEDURE — 3079F DIAST BP 80-89 MM HG: CPT | Mod: CPTII,S$GLB,, | Performed by: STUDENT IN AN ORGANIZED HEALTH CARE EDUCATION/TRAINING PROGRAM

## 2023-07-26 PROCEDURE — 3008F PR BODY MASS INDEX (BMI) DOCUMENTED: ICD-10-PCS | Mod: CPTII,S$GLB,, | Performed by: STUDENT IN AN ORGANIZED HEALTH CARE EDUCATION/TRAINING PROGRAM

## 2023-07-26 PROCEDURE — 3074F SYST BP LT 130 MM HG: CPT | Mod: CPTII,S$GLB,, | Performed by: STUDENT IN AN ORGANIZED HEALTH CARE EDUCATION/TRAINING PROGRAM

## 2023-07-26 PROCEDURE — 99387 INIT PM E/M NEW PAT 65+ YRS: CPT | Mod: S$GLB,,, | Performed by: STUDENT IN AN ORGANIZED HEALTH CARE EDUCATION/TRAINING PROGRAM

## 2023-07-26 PROCEDURE — 1159F PR MEDICATION LIST DOCUMENTED IN MEDICAL RECORD: ICD-10-PCS | Mod: CPTII,S$GLB,, | Performed by: STUDENT IN AN ORGANIZED HEALTH CARE EDUCATION/TRAINING PROGRAM

## 2023-07-26 PROCEDURE — 1101F PR PT FALLS ASSESS DOC 0-1 FALLS W/OUT INJ PAST YR: ICD-10-PCS | Mod: CPTII,S$GLB,, | Performed by: STUDENT IN AN ORGANIZED HEALTH CARE EDUCATION/TRAINING PROGRAM

## 2023-07-26 PROCEDURE — 3288F FALL RISK ASSESSMENT DOCD: CPT | Mod: CPTII,S$GLB,, | Performed by: STUDENT IN AN ORGANIZED HEALTH CARE EDUCATION/TRAINING PROGRAM

## 2023-07-26 PROCEDURE — 3074F PR MOST RECENT SYSTOLIC BLOOD PRESSURE < 130 MM HG: ICD-10-PCS | Mod: CPTII,S$GLB,, | Performed by: STUDENT IN AN ORGANIZED HEALTH CARE EDUCATION/TRAINING PROGRAM

## 2023-07-26 PROCEDURE — 3044F HG A1C LEVEL LT 7.0%: CPT | Mod: CPTII,S$GLB,, | Performed by: STUDENT IN AN ORGANIZED HEALTH CARE EDUCATION/TRAINING PROGRAM

## 2023-07-26 PROCEDURE — 1126F AMNT PAIN NOTED NONE PRSNT: CPT | Mod: CPTII,S$GLB,, | Performed by: STUDENT IN AN ORGANIZED HEALTH CARE EDUCATION/TRAINING PROGRAM

## 2023-07-26 PROCEDURE — 1160F RVW MEDS BY RX/DR IN RCRD: CPT | Mod: CPTII,S$GLB,, | Performed by: STUDENT IN AN ORGANIZED HEALTH CARE EDUCATION/TRAINING PROGRAM

## 2023-07-26 PROCEDURE — 3288F PR FALLS RISK ASSESSMENT DOCUMENTED: ICD-10-PCS | Mod: CPTII,S$GLB,, | Performed by: STUDENT IN AN ORGANIZED HEALTH CARE EDUCATION/TRAINING PROGRAM

## 2023-07-26 PROCEDURE — 3044F PR MOST RECENT HEMOGLOBIN A1C LEVEL <7.0%: ICD-10-PCS | Mod: CPTII,S$GLB,, | Performed by: STUDENT IN AN ORGANIZED HEALTH CARE EDUCATION/TRAINING PROGRAM

## 2023-07-26 PROCEDURE — 3008F BODY MASS INDEX DOCD: CPT | Mod: CPTII,S$GLB,, | Performed by: STUDENT IN AN ORGANIZED HEALTH CARE EDUCATION/TRAINING PROGRAM

## 2023-07-26 PROCEDURE — 99387 PR PREVENTIVE VISIT,NEW,65 & OVER: ICD-10-PCS | Mod: S$GLB,,, | Performed by: STUDENT IN AN ORGANIZED HEALTH CARE EDUCATION/TRAINING PROGRAM

## 2023-07-26 PROCEDURE — 3079F PR MOST RECENT DIASTOLIC BLOOD PRESSURE 80-89 MM HG: ICD-10-PCS | Mod: CPTII,S$GLB,, | Performed by: STUDENT IN AN ORGANIZED HEALTH CARE EDUCATION/TRAINING PROGRAM

## 2023-07-26 PROCEDURE — 99999 PR PBB SHADOW E&M-EST. PATIENT-LVL V: CPT | Mod: PBBFAC,,, | Performed by: STUDENT IN AN ORGANIZED HEALTH CARE EDUCATION/TRAINING PROGRAM

## 2023-07-26 PROCEDURE — 99999 PR PBB SHADOW E&M-EST. PATIENT-LVL V: ICD-10-PCS | Mod: PBBFAC,,, | Performed by: STUDENT IN AN ORGANIZED HEALTH CARE EDUCATION/TRAINING PROGRAM

## 2023-07-26 PROCEDURE — 1159F MED LIST DOCD IN RCRD: CPT | Mod: CPTII,S$GLB,, | Performed by: STUDENT IN AN ORGANIZED HEALTH CARE EDUCATION/TRAINING PROGRAM

## 2023-07-26 PROCEDURE — 1101F PT FALLS ASSESS-DOCD LE1/YR: CPT | Mod: CPTII,S$GLB,, | Performed by: STUDENT IN AN ORGANIZED HEALTH CARE EDUCATION/TRAINING PROGRAM

## 2023-07-26 RX ORDER — CYANOCOBALAMIN 1000 UG/ML
1000 INJECTION, SOLUTION INTRAMUSCULAR; SUBCUTANEOUS
Qty: 10 ML | Refills: 3 | Status: SHIPPED | OUTPATIENT
Start: 2023-07-26

## 2023-07-26 RX ORDER — ROSUVASTATIN CALCIUM 10 MG/1
10 TABLET, COATED ORAL NIGHTLY
Qty: 90 TABLET | Refills: 3 | Status: SHIPPED | OUTPATIENT
Start: 2023-07-26 | End: 2024-03-25 | Stop reason: SDUPTHER

## 2023-07-26 RX ORDER — DIAZEPAM 10 MG/1
10 TABLET ORAL 2 TIMES DAILY
Qty: 60 TABLET | Refills: 5 | Status: SHIPPED | OUTPATIENT
Start: 2023-07-26 | End: 2024-03-11 | Stop reason: SDUPTHER

## 2023-07-26 RX ORDER — HYDROXYZINE HYDROCHLORIDE 25 MG/1
25 TABLET, FILM COATED ORAL 3 TIMES DAILY PRN
Qty: 90 TABLET | Refills: 3 | Status: SHIPPED | OUTPATIENT
Start: 2023-07-26 | End: 2024-03-11 | Stop reason: SDUPTHER

## 2023-07-26 RX ORDER — HYDROXYZINE HYDROCHLORIDE 25 MG/1
25 TABLET, FILM COATED ORAL 3 TIMES DAILY PRN
Qty: 90 TABLET | Refills: 3 | Status: SHIPPED | OUTPATIENT
Start: 2023-07-26 | End: 2023-07-26 | Stop reason: SDUPTHER

## 2023-07-26 RX ORDER — CYANOCOBALAMIN 1000 UG/ML
1000 INJECTION, SOLUTION INTRAMUSCULAR; SUBCUTANEOUS
Qty: 10 ML | Refills: 3 | Status: SHIPPED | OUTPATIENT
Start: 2023-07-26 | End: 2023-07-26 | Stop reason: SDUPTHER

## 2023-07-26 RX ORDER — ROSUVASTATIN CALCIUM 10 MG/1
10 TABLET, COATED ORAL NIGHTLY
Qty: 90 TABLET | Refills: 3 | Status: SHIPPED | OUTPATIENT
Start: 2023-07-26 | End: 2023-07-26 | Stop reason: SDUPTHER

## 2023-07-26 NOTE — PROGRESS NOTES
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 years   She has not followed with neurology in years             Ophtho    Glaucoma - Primary    Overview     Follows with joe   On drops             Derm    Itching    Relevant Medications    hydrOXYzine HCL (ATARAX) 25 MG tablet    Neurodermatitis    Overview     To BLE, back, BUE - using dial soap, anamaria dish soap  Worse with stress; better with valium 10mg BID             Cardiac/Vascular    Abdominal aortic aneurysm (AAA) without rupture    Overview     US AAA 2022  Abdominal aortic aneurysm     Due for repeat, ordered          Relevant Orders    US Abdominal Aorta       Endocrine    Vitamin B12 deficiency    Relevant Medications    cyanocobalamin 1,000 mcg/mL injection       Other    Insomnia    Overview     -is on valium chronically  -denies adverse effects of medication  -has tried multiple OTC medication with minimal benefit  -discussed importance of good sleep hygiene practices              Other Visit Diagnoses       Postmenopausal        Relevant Orders    DXA Bone Density Axial Skeleton 1 or more sites    Encounter for screening mammogram for malignant neoplasm of breast        Relevant Orders    Mammo Digital Screening Bilat w/ Charly    Colon cancer screening        Relevant Orders    Cologuard Screening (Multitarget Stool DNA)    Other hyperlipidemia        Relevant Medications    rosuvastatin (CRESTOR) 10 MG tablet              Patient ID: Fauzia Cornelius is a 68 y.o. female.    Chief Complaint:  establish care    Patient is here to establish care. Has a hx of  has a past medical history of Abdominal aortic aneurysm (AAA) 3.0 cm to 5.0 cm in diameter in female, Arthritis, Glaucoma, Lumbago without sciatica, PTSD (post-traumatic stress disorder), and Seizures.     Reports feeling well. Taking all meds as prescribed. Denies fevers, chills, chest pain, SOB, fatigue, abdominal pain, nausea,  "vomiting, dysuria, hematuria, hematochezia, or melena.     Health Maintenance Topics with due status: Not Due       Topic Last Completion Date    Cervical Cancer Screening 12/04/2018    Influenza Vaccine 10/16/2020    Hemoglobin A1c (Prediabetes) 05/22/2023    Lipid Panel 05/22/2023        ==============================================  History reviewed.   Health Maintenance Due   Topic Date Due    TETANUS VACCINE  Never done    Colorectal Cancer Screening  Never done    Shingles Vaccine (2 of 2) 12/11/2020    Mammogram  05/13/2021    COVID-19 Vaccine (4 - Moderna series) 04/01/2023    DEXA Scan  06/09/2023       Past Medical History:  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     Past Surgical History:   Procedure Laterality Date    CATARACT EXTRACTION      FOREARM SURGERY      elbow, upper & lower arm, repaired tendons    HIP SURGERY Left 11/18/2013    replacement     KNEE SURGERY      due to fracture    LUMBAR FUSION  08/2020    RETINAL DETACHMENT SURGERY Right     WRIST FRACTURE SURGERY  2011    right     Review of patient's allergies indicates:   Allergen Reactions    Gabapentin      Feel wierd    Morphine Other (See Comments)     Lowers Blood Pressure   Other reaction(s): Psychotic paranoia    Prednisone      Hyperactive     Trazodone      Feels "shitty"    Bupropion hcl Anxiety     Current Outpatient Medications on File Prior to Visit   Medication Sig Dispense Refill    brimonidine 0.15 % OPTH DROP (ALPHAGAN) 0.15 % ophthalmic solution Place 1 drop into both eyes 3 (three) times daily. 15 mL 4    diazePAM (VALIUM) 5 MG tablet TAKE 1 TABLET BY MOUTH EVERY TWELVE HOURS AS NEEDED FOR ANXIETY 60 tablet 0    dorzolamide-timolol 2-0.5% (COSOPT) 22.3-6.8 mg/mL ophthalmic solution Place 1 drop into both eyes 3 (three) times " daily. 10 mL 3    latanoprost 0.005 % ophthalmic solution put ONE drop IN EACH EYE EVERY DAY 2.5 mL 11    ketorolac (TORADOL) 10 mg tablet Take 1 tablet (10 mg total) by mouth every 6 (six) hours as needed for Pain. (Patient not taking: Reported on 7/26/2023) 30 tablet 3    temazepam (RESTORIL) 30 mg capsule Take 1 capsule (30 mg total) by mouth nightly as needed for Insomnia. (Patient not taking: Reported on 7/26/2023) 30 capsule 0    [DISCONTINUED] albuterol (VENTOLIN HFA) 90 mcg/actuation inhaler Inhale 2 puffs into the lungs every 6 (six) hours as needed for Wheezing. Rescue (Patient not taking: Reported on 12/1/2022) 18 g 3    [DISCONTINUED] alendronate (FOSAMAX) 70 MG tablet Take 1 tablet (70 mg total) by mouth every 7 days. (Patient not taking: Reported on 12/1/2022) 12 tablet 3    [DISCONTINUED] cyanocobalamin 1,000 mcg/mL injection Inject 1 mL (1,000 mcg total) into the muscle every 30 days. (Patient not taking: Reported on 7/26/2023) 10 mL 3    [DISCONTINUED] hydrOXYzine HCL (ATARAX) 25 MG tablet TAKE 1 TABLET BY MOUTH THREE TIMES DAILY AS NEEDED FOR ITCHING (Patient not taking: Reported on 7/26/2023) 90 tablet 3    [DISCONTINUED] rosuvastatin (CRESTOR) 10 MG tablet Take 1 tablet (10 mg total) by mouth every evening. (Patient not taking: Reported on 7/26/2023) 90 tablet 3     No current facility-administered medications on file prior to visit.     Social History     Socioeconomic History    Marital status:    Tobacco Use    Smoking status: Former     Packs/day: 0.50     Years: 40.00     Pack years: 20.00     Types: Cigarettes, Vaping with nicotine    Smokeless tobacco: Never    Tobacco comments:     Not smoking cigarettes only vaping.    Substance and Sexual Activity    Alcohol use: Not Currently     Comment: socially    Drug use: No   Other Topics Concern    Are you pregnant or think you may be? No     Social Determinants of Health     Financial Resource Strain: High Risk    Difficulty of Paying  Living Expenses: Very hard   Food Insecurity: Food Insecurity Present    Worried About Running Out of Food in the Last Year: Sometimes true    Ran Out of Food in the Last Year: Sometimes true   Transportation Needs: Unmet Transportation Needs    Lack of Transportation (Medical): Yes    Lack of Transportation (Non-Medical): Yes   Physical Activity: Inactive    Days of Exercise per Week: 0 days    Minutes of Exercise per Session: 0 min   Stress: Stress Concern Present    Feeling of Stress : Very much   Social Connections: Unknown    Frequency of Communication with Friends and Family: More than three times a week    Frequency of Social Gatherings with Friends and Family: Once a week    Active Member of Clubs or Organizations: No    Attends Club or Organization Meetings: Never    Marital Status:    Housing Stability: High Risk    Unable to Pay for Housing in the Last Year: Yes    Number of Places Lived in the Last Year: 1    Unstable Housing in the Last Year: No     Family History   Problem Relation Age of Onset    Coronary artery disease Mother     Aortic aneurysm Mother     COPD Mother     Stroke Mother     Coronary artery disease Father     Melanoma Neg Hx           Review of Systems   12 point review of systems per hpi, otherwise negative         Objective:    Nursing note and vitals reviewed.  Vitals:    07/26/23 1509   BP: 129/84   Pulse: 68   Resp: 18   Temp: 97.4 °F (36.3 °C)     Body mass index is 27.66 kg/m².     Physical Exam   Constitutional: SHE is oriented to person, place, and time. She appears well-developed and well-nourished. No distress.   HENT: WNL  Head: Normocephalic and atraumatic.   Eyes: Pupils are equal, round, and reactive to light. EOM are normal.   Neck: Normal range of motion. Neck supple.   Cardiovascular: Normal rate, regular rhythm, normal heart sounds and intact distal pulses.   No murmur heard.  Pulmonary/Chest: Effort normal and breath sounds normal. No respiratory distress.  She has no wheezes.   GI: soft, non distended, no ttp, no rebound/guarding  Musculoskeletal: Normal range of motion. She exhibits no edema.   Neurological: She is alert and oriented to person, place, and time. No cranial nerve deficit.   Skin: Skin is warm and dry. Capillary refill takes less than 2 seconds. Healing shallow ulcerations to BUE/BLE   Psychiatric: She has a normal mood and affect. Her behavior is normal.       The 10-year ASCVD risk score (Gio DK, et al., 2019) is: 8.5%    Values used to calculate the score:      Age: 68 years      Sex: Female      Is Non- : No      Diabetic: No      Tobacco smoker: No      Systolic Blood Pressure: 129 mmHg      Is BP treated: No      HDL Cholesterol: 43 mg/dL      Total Cholesterol: 258 mg/dL      Vandana Arriaga MD    We Offer Telehealth & Same Day Appointments!   Book your Telehealth appointment with me through my nurse or   Clinic appointments on [employer]!  [phone]     To Schedule appointments online, go to [employer]: [email or web address]

## 2023-07-28 ENCOUNTER — NURSE TRIAGE (OUTPATIENT)
Dept: ADMINISTRATIVE | Facility: CLINIC | Age: 68
End: 2023-07-28
Payer: COMMERCIAL

## 2023-07-28 NOTE — TELEPHONE ENCOUNTER
Pt reports saw Dr. Arriaga in [place] on 7/26/23 and was supposed to get a Valium refill sent to her pharmacy, but the pharmacy is stating they need to speak to the MD before they can fill it and they have not been able to make contact with Dr. Arriaga over the last two days. Pt advised controlled substance refills have to be handled with the office is open per protocol, but a message will be routed to the office to notify of the issue. Pt encouraged to call back with any worsening symptoms or questions and verbalized understanding.      Reason for Disposition   Caller requesting a CONTROLLED substance prescription refill (e.g., narcotics, ADHD medicines)    Protocols used: Medication Refill and Renewal Call-A-AH

## 2023-07-31 NOTE — TELEPHONE ENCOUNTER
I called and spoke with [facility] regarding patients prescription refill. Tech reports the medication was picked up on Saturday.

## 2023-08-14 ENCOUNTER — TELEPHONE (OUTPATIENT)
Dept: FAMILY MEDICINE | Facility: CLINIC | Age: 68
End: 2023-08-14
Payer: COMMERCIAL

## 2023-08-14 NOTE — TELEPHONE ENCOUNTER
----- Message from Tonie Wise sent at 8/14/2023  8:02 AM CDT -----  Contact: [phone]    Patient: Fauzia Cornelius-    Reason: The patient is requesting a call back from the nurse to get assistance with rescheduling an     appointments and the patient for cologuard to be resent to her new address.    Comments: Please call the patient back to advise.

## 2023-09-14 ENCOUNTER — TELEPHONE (OUTPATIENT)
Dept: FAMILY MEDICINE | Facility: CLINIC | Age: 68
End: 2023-09-14
Payer: COMMERCIAL

## 2023-09-14 NOTE — TELEPHONE ENCOUNTER
Spoke with patient in regards to her 9/25 appointment. Patient was rescheduled for 9/28 at 3:20 pm with Dr. Bueno. Time slot is held.

## 2023-09-29 RX ORDER — BRIMONIDINE TARTRATE 1.5 MG/ML
1 SOLUTION/ DROPS OPHTHALMIC 3 TIMES DAILY
Qty: 15 ML | Refills: 1 | Status: SHIPPED | OUTPATIENT
Start: 2023-09-29 | End: 2024-09-28

## 2023-09-29 NOTE — PROGRESS NOTES
Assessment /Plan     For exam results, see Encounter Report.    There are no diagnoses linked to this encounter.  Refilled drops until appt

## 2023-10-18 ENCOUNTER — TELEPHONE (OUTPATIENT)
Dept: FAMILY MEDICINE | Facility: CLINIC | Age: 68
End: 2023-10-18
Payer: COMMERCIAL

## 2023-10-18 NOTE — TELEPHONE ENCOUNTER
----- Message from Mary Zuleta sent at 10/18/2023  3:34 PM CDT -----  Name of Who is Calling:Patient           What is the request in detail:Patient was in ER yesterday. Patient has aneurysm that needs to be repaired and diverticulitis. Wants to speak with Dr about medication.           Can the clinic reply by [facility]:no           What Number to Call Back if not in [facility]: [phone]

## 2023-10-20 ENCOUNTER — HOSPITAL ENCOUNTER (OUTPATIENT)
Dept: RADIOLOGY | Facility: HOSPITAL | Age: 68
Discharge: HOME | End: 2023-10-20
Attending: PHYSICIAN ASSISTANT
Payer: COMMERCIAL

## 2023-10-20 ENCOUNTER — OFFICE VISIT (OUTPATIENT)
Dept: FAMILY MEDICINE | Facility: CLINIC | Age: 68
End: 2023-10-20
Payer: COMMERCIAL

## 2023-10-20 VITALS
SYSTOLIC BLOOD PRESSURE: 139 MMHG | OXYGEN SATURATION: 98 % | HEIGHT: 65 IN | HEART RATE: 78 BPM | TEMPERATURE: 98 F | RESPIRATION RATE: 18 BRPM | DIASTOLIC BLOOD PRESSURE: 89 MMHG | BODY MASS INDEX: 29.87 KG/M2 | WEIGHT: 179.31 LBS

## 2023-10-20 DIAGNOSIS — M25.551 BILATERAL HIP PAIN: Primary | ICD-10-CM

## 2023-10-20 DIAGNOSIS — M25.551 BILATERAL HIP PAIN: ICD-10-CM

## 2023-10-20 DIAGNOSIS — M25.552 BILATERAL HIP PAIN: ICD-10-CM

## 2023-10-20 DIAGNOSIS — M25.552 BILATERAL HIP PAIN: Primary | ICD-10-CM

## 2023-10-20 PROCEDURE — 1159F MED LIST DOCD IN RCRD: CPT | Mod: CPTII,S$GLB,, | Performed by: PHYSICIAN ASSISTANT

## 2023-10-20 PROCEDURE — 3044F HG A1C LEVEL LT 7.0%: CPT | Mod: CPTII,S$GLB,, | Performed by: PHYSICIAN ASSISTANT

## 2023-10-20 PROCEDURE — 99213 PR OFFICE/OUTPT VISIT, EST, LEVL III, 20-29 MIN: ICD-10-PCS | Mod: S$GLB,,, | Performed by: PHYSICIAN ASSISTANT

## 2023-10-20 PROCEDURE — 99999 PR PBB SHADOW E&M-EST. PATIENT-LVL IV: ICD-10-PCS | Mod: PBBFAC,,, | Performed by: PHYSICIAN ASSISTANT

## 2023-10-20 PROCEDURE — 1125F AMNT PAIN NOTED PAIN PRSNT: CPT | Mod: CPTII,S$GLB,, | Performed by: PHYSICIAN ASSISTANT

## 2023-10-20 PROCEDURE — 3075F SYST BP GE 130 - 139MM HG: CPT | Mod: CPTII,S$GLB,, | Performed by: PHYSICIAN ASSISTANT

## 2023-10-20 PROCEDURE — 1101F PT FALLS ASSESS-DOCD LE1/YR: CPT | Mod: CPTII,S$GLB,, | Performed by: PHYSICIAN ASSISTANT

## 2023-10-20 PROCEDURE — 1125F PR PAIN SEVERITY QUANTIFIED, PAIN PRESENT: ICD-10-PCS | Mod: CPTII,S$GLB,, | Performed by: PHYSICIAN ASSISTANT

## 2023-10-20 PROCEDURE — 1160F PR REVIEW ALL MEDS BY PRESCRIBER/CLIN PHARMACIST DOCUMENTED: ICD-10-PCS | Mod: CPTII,S$GLB,, | Performed by: PHYSICIAN ASSISTANT

## 2023-10-20 PROCEDURE — 3008F PR BODY MASS INDEX (BMI) DOCUMENTED: ICD-10-PCS | Mod: CPTII,S$GLB,, | Performed by: PHYSICIAN ASSISTANT

## 2023-10-20 PROCEDURE — 1101F PR PT FALLS ASSESS DOC 0-1 FALLS W/OUT INJ PAST YR: ICD-10-PCS | Mod: CPTII,S$GLB,, | Performed by: PHYSICIAN ASSISTANT

## 2023-10-20 PROCEDURE — 3044F PR MOST RECENT HEMOGLOBIN A1C LEVEL <7.0%: ICD-10-PCS | Mod: CPTII,S$GLB,, | Performed by: PHYSICIAN ASSISTANT

## 2023-10-20 PROCEDURE — 3288F FALL RISK ASSESSMENT DOCD: CPT | Mod: CPTII,S$GLB,, | Performed by: PHYSICIAN ASSISTANT

## 2023-10-20 PROCEDURE — 3079F PR MOST RECENT DIASTOLIC BLOOD PRESSURE 80-89 MM HG: ICD-10-PCS | Mod: CPTII,S$GLB,, | Performed by: PHYSICIAN ASSISTANT

## 2023-10-20 PROCEDURE — 99999 PR PBB SHADOW E&M-EST. PATIENT-LVL IV: CPT | Mod: PBBFAC,,, | Performed by: PHYSICIAN ASSISTANT

## 2023-10-20 PROCEDURE — 99213 OFFICE O/P EST LOW 20 MIN: CPT | Mod: S$GLB,,, | Performed by: PHYSICIAN ASSISTANT

## 2023-10-20 PROCEDURE — 73521 XR HIPS BILATERAL 2 VIEW INCL AP PELVIS: ICD-10-PCS | Mod: 26,,, | Performed by: RADIOLOGY

## 2023-10-20 PROCEDURE — 3008F BODY MASS INDEX DOCD: CPT | Mod: CPTII,S$GLB,, | Performed by: PHYSICIAN ASSISTANT

## 2023-10-20 PROCEDURE — 73521 X-RAY EXAM HIPS BI 2 VIEWS: CPT | Mod: TC,PO

## 2023-10-20 PROCEDURE — 3288F PR FALLS RISK ASSESSMENT DOCUMENTED: ICD-10-PCS | Mod: CPTII,S$GLB,, | Performed by: PHYSICIAN ASSISTANT

## 2023-10-20 PROCEDURE — 1160F RVW MEDS BY RX/DR IN RCRD: CPT | Mod: CPTII,S$GLB,, | Performed by: PHYSICIAN ASSISTANT

## 2023-10-20 PROCEDURE — 3079F DIAST BP 80-89 MM HG: CPT | Mod: CPTII,S$GLB,, | Performed by: PHYSICIAN ASSISTANT

## 2023-10-20 PROCEDURE — 3075F PR MOST RECENT SYSTOLIC BLOOD PRESS GE 130-139MM HG: ICD-10-PCS | Mod: CPTII,S$GLB,, | Performed by: PHYSICIAN ASSISTANT

## 2023-10-20 PROCEDURE — 1159F PR MEDICATION LIST DOCUMENTED IN MEDICAL RECORD: ICD-10-PCS | Mod: CPTII,S$GLB,, | Performed by: PHYSICIAN ASSISTANT

## 2023-10-20 PROCEDURE — 73521 X-RAY EXAM HIPS BI 2 VIEWS: CPT | Mod: 26,,, | Performed by: RADIOLOGY

## 2023-10-20 RX ORDER — AMOXICILLIN AND CLAVULANATE POTASSIUM 875; 125 MG/1; MG/1
1 TABLET, FILM COATED ORAL EVERY 12 HOURS
COMMUNITY
Start: 2023-10-17 | End: 2024-03-11

## 2023-10-20 RX ORDER — PANTOPRAZOLE SODIUM 40 MG/1
40 TABLET, DELAYED RELEASE ORAL
COMMUNITY
Start: 2023-10-17

## 2023-10-20 RX ORDER — HYDROCODONE BITARTRATE AND ACETAMINOPHEN 5; 325 MG/1; MG/1
1 TABLET ORAL EVERY 6 HOURS PRN
Qty: 20 TABLET | Refills: 0 | Status: SHIPPED | OUTPATIENT
Start: 2023-10-20 | End: 2024-03-11

## 2023-10-20 NOTE — PROGRESS NOTES
Assessment/Plan:    Problem List Items Addressed This Visit    None  Visit Diagnoses       Bilateral hip pain    -  Primary    Relevant Medications    HYDROcodone-acetaminophen (NORCO) 5-325 mg per tablet    Other Relevant Orders    X-Ray Hips Bilateral 2 View Incl AP Pelvis        -new onset hip pain >3 days  -will obtain bilateral hip XR today  -continue conservative treatment, rest, ice/heat applications, PRN anti-inflammatory medication  -will Rx short course of Norco PRN for severe pain. The patient was checked in the [facility] Board of Pharmacy's Prescription Monitoring Program. There appears to be no incongruities with the patient's verbalized history.   -consider PT and/or ortho referral if symptoms persist  -ER precautions for severe or worsening of symptoms    Follow up if symptoms worsen or fail to improve.    Maryann Dumont PA-C  _____________________________________________________________________________________________________________________________________________________    CC: bilateral hip pain    HPI: Patient is in clinic today as an established patient here for bilateral hip pain. Symptom onset was was about 3 days ago, and has been unchanged since that time. Denies recent trauma or injuries. Pain is worse in her L hip. She reports difficulty ambulating due to pain. She has a history of chronic low back pain, however, she stated that current symptoms feel different. She also has a history of a L hip replacement in 2013. She has been taking Ibuprofen, Naproxen, Tylenol and BC powder with no improvement of symptoms. She is unable to take Toradol due to GI upset. Patient also reports recently been evaluated in the ED for bloody stool. She had CTA abd/pelvis which showed diverticulitis, an infrarenal abdominal aortic aneurysm, moderate gallbladder distention and small R nephrolithiasis. She was discharged with oral antibiotics (Augmentin). She stated that her hip pain started around that  "time. She denies prior history of hip pain. No other complaints today.     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     Past Surgical History:   Procedure Laterality Date    CATARACT EXTRACTION      FOREARM SURGERY      elbow, upper & lower arm, repaired tendons    HIP SURGERY Left 11/18/2013    replacement     KNEE SURGERY      due to fracture    LUMBAR FUSION  08/2020    RETINAL DETACHMENT SURGERY Right     WRIST FRACTURE SURGERY  2011    right     Review of patient's allergies indicates:   Allergen Reactions    Gabapentin      Feel wierd    Morphine Other (See Comments)     Lowers Blood Pressure   Other reaction(s): Psychotic paranoia    Prednisone      Hyperactive     Trazodone      Feels "shitty"    Bupropion hcl Anxiety     Social History     Tobacco Use    Smoking status: Former     Current packs/day: 0.50     Average packs/day: 0.5 packs/day for 40.0 years (20.0 ttl pk-yrs)     Types: Cigarettes, Vaping with nicotine    Smokeless tobacco: Never    Tobacco comments:     Not smoking cigarettes only vaping.    Substance Use Topics    Alcohol use: Not Currently     Comment: socially    Drug use: No     Family History   Problem Relation Age of Onset    Coronary artery disease Mother     Aortic aneurysm Mother     COPD Mother     Stroke Mother     Coronary artery disease Father     Melanoma Neg Hx      Current Outpatient Medications on File Prior to Visit   Medication Sig Dispense Refill    amoxicillin-clavulanate 875-125mg (AUGMENTIN) 875-125 mg per tablet Take 1 tablet by mouth every 12 (twelve) hours.      brimonidine 0.15 % OPTH DROP (ALPHAGAN) 0.15 % ophthalmic solution Place 1 drop into both eyes 3 (three) times daily. 15 mL 1    dorzolamide-timolol 2-0.5% (COSOPT) 22.3-6.8 mg/mL ophthalmic solution Place 1 drop " into both eyes 3 (three) times daily. 10 mL 3    hydrOXYzine HCL (ATARAX) 25 MG tablet Take 1 tablet (25 mg total) by mouth 3 (three) times daily as needed for Itching. 90 tablet 3    latanoprost 0.005 % ophthalmic solution put ONE drop IN EACH EYE EVERY DAY 2.5 mL 11    pantoprazole (PROTONIX) 40 MG tablet Take 40 mg by mouth.      cyanocobalamin 1,000 mcg/mL injection Inject 1 mL (1,000 mcg total) into the muscle every 30 days. (Patient not taking: Reported on 10/20/2023) 10 mL 3    diazePAM (VALIUM) 10 MG Tab Take 1 tablet (10 mg total) by mouth 2 (two) times a day. (Patient not taking: Reported on 10/20/2023) 60 tablet 5    ketorolac (TORADOL) 10 mg tablet Take 1 tablet (10 mg total) by mouth every 6 (six) hours as needed for Pain. (Patient not taking: Reported on 7/26/2023) 30 tablet 3    rosuvastatin (CRESTOR) 10 MG tablet Take 1 tablet (10 mg total) by mouth every evening. (Patient not taking: Reported on 10/20/2023) 90 tablet 3    temazepam (RESTORIL) 30 mg capsule Take 1 capsule (30 mg total) by mouth nightly as needed for Insomnia. (Patient not taking: Reported on 7/26/2023) 30 capsule 0     Current Facility-Administered Medications on File Prior to Visit   Medication Dose Route Frequency Provider Last Rate Last Admin    [DISCONTINUED] amoxicillin-clavulanate 875-125mg per tablet  1 tablet Oral  Provider, Generic External Data        [DISCONTINUED] GENERIC EXTERNAL MEDICATION     Provider, Generic External Data        [DISCONTINUED] GENERIC EXTERNAL MEDICATION     Provider, Generic External Data           Review of Systems   Constitutional:  Negative for chills, diaphoresis, fatigue and fever.   HENT:  Negative for congestion, ear pain, postnasal drip, sinus pain and sore throat.    Eyes:  Negative for pain and redness.   Respiratory:  Negative for cough, chest tightness and shortness of breath.    Cardiovascular:  Negative for chest pain and leg swelling.   Gastrointestinal:  Negative for abdominal pain,  "constipation, diarrhea, nausea and vomiting.   Genitourinary:  Negative for dysuria and hematuria.   Musculoskeletal:  Positive for arthralgias and gait problem. Negative for joint swelling.   Skin:  Negative for rash.   Neurological:  Negative for dizziness, syncope and headaches.   Psychiatric/Behavioral:  Negative for dysphoric mood. The patient is not nervous/anxious.        Vitals:    10/20/23 1107   BP: 139/89   Pulse: 78   Resp: 18   Temp: 97.5 °F (36.4 °C)   TempSrc: Tympanic   SpO2: 98%   Weight: 81.3 kg (179 lb 4.8 oz)   Height: 5' 5" (1.651 m)       Wt Readings from Last 3 Encounters:   10/20/23 81.3 kg (179 lb 4.8 oz)   07/26/23 75.4 kg (166 lb 3.2 oz)   05/22/23 73.2 kg (161 lb 7.8 oz)       Physical Exam  Constitutional:       General: She is not in acute distress.     Appearance: Normal appearance. She is well-developed.   HENT:      Head: Normocephalic and atraumatic.   Eyes:      Conjunctiva/sclera: Conjunctivae normal.   Cardiovascular:      Rate and Rhythm: Normal rate and regular rhythm.      Pulses: Normal pulses.      Heart sounds: Normal heart sounds. No murmur heard.  Pulmonary:      Effort: Pulmonary effort is normal. No respiratory distress.      Breath sounds: Normal breath sounds.   Abdominal:      General: Bowel sounds are normal. There is no distension.      Palpations: Abdomen is soft.      Tenderness: There is no abdominal tenderness.   Musculoskeletal:         General: Normal range of motion.      Cervical back: Normal range of motion and neck supple.      Lumbar back: Tenderness present. No swelling or deformity. Normal range of motion.      Right hip: Tenderness present. No deformity. Normal range of motion.      Left hip: Tenderness present. No deformity. Normal range of motion.   Skin:     General: Skin is warm and dry.      Findings: No rash.   Neurological:      General: No focal deficit present.      Mental Status: She is alert and oriented to person, place, and time. "   Psychiatric:         Mood and Affect: Mood normal.         Behavior: Behavior normal.         Health Maintenance   Topic Date Due    TETANUS VACCINE  Never done    Colorectal Cancer Screening  Never done    Shingles Vaccine (2 of 2) 12/11/2020    Mammogram  05/13/2021    DEXA Scan  06/09/2023    Lipid Panel  05/22/2028    Hepatitis C Screening  Completed

## 2023-10-20 NOTE — PROGRESS NOTES
Results have been released via [employer]. Please verify that these have been viewed by patient. If not, please call patient with results.     I have sent a message to them with the following interpretation (see below).    I have reviewed your recent bilateral hip XR which showed no acute findings or significant abnormalities. Please let me know if symptoms worsen or do not improve.     Please do not hesitate to call or message with any additional questions or concerns.    Maryann Dumont PA-C

## 2023-11-01 NOTE — TELEPHONE ENCOUNTER
Called and spoke with the patient and she will be coming in tomorrow and see Thao Gannon @ 1:20 pm.   Jose Thornton is a 56 y.o. male on day 8 of admission presenting with Colovesical fistula.    TCC Note:   Patient is passing flatus, advanced diet, discontinue IV fluids. Premarija d/c'd.   ADOD 11/3 [facility] PT

## 2023-11-27 RX ORDER — DORZOLAMIDE HYDROCHLORIDE AND TIMOLOL MALEATE 20; 5 MG/ML; MG/ML
1 SOLUTION/ DROPS OPHTHALMIC 3 TIMES DAILY
Qty: 10 ML | Refills: 3 | Status: SHIPPED | OUTPATIENT
Start: 2023-11-27 | End: 2024-11-26

## 2024-01-31 DIAGNOSIS — F43.10 PTSD (POST-TRAUMATIC STRESS DISORDER): ICD-10-CM

## 2024-01-31 DIAGNOSIS — F51.04 PSYCHOPHYSIOLOGICAL INSOMNIA: ICD-10-CM

## 2024-01-31 DIAGNOSIS — F41.9 ANXIETY: ICD-10-CM

## 2024-01-31 NOTE — TELEPHONE ENCOUNTER
No care due was identified.  [facility] Embedded Care Due Messages. Reference number: [id number].   1/31/2024 5:16:12 PM CST

## 2024-02-01 RX ORDER — DIAZEPAM 10 MG/1
10 TABLET ORAL 2 TIMES DAILY
Qty: 60 TABLET | Refills: 5 | OUTPATIENT
Start: 2024-02-01

## 2024-02-01 NOTE — TELEPHONE ENCOUNTER
Patient is due for an appointment.  Please schedule appointment with FLORINA for medication  refill.

## 2024-02-01 NOTE — TELEPHONE ENCOUNTER
Refill Routing Note   Medication(s) are not appropriate for processing by [facility] for the following reason(s):        Outside of protocol    [facility] action(s):  Route               Appointments  past 12m or future 3m with PCP    Date Provider   Last Visit   7/26/2023 Vandana Arriaga MD   Next Visit   Visit date not found Vandana Arriaga MD   ED visits in past 90 days: 0        Note composed:7:31 AM 02/01/2024

## 2024-03-07 ENCOUNTER — TELEPHONE (OUTPATIENT)
Dept: FAMILY MEDICINE | Facility: CLINIC | Age: 69
End: 2024-03-07
Payer: MEDICARE

## 2024-03-07 NOTE — TELEPHONE ENCOUNTER
----- Message from Melanie Bowen sent at 3/7/2024  4:00 PM CST -----  Contact: Fauzia Cage is needing a call back in regards to her appt tomorrow she will not be able to drive in bad weather and is looking to be scheduled Monday afternoon please call her back at [phone]

## 2024-03-11 ENCOUNTER — LAB VISIT (OUTPATIENT)
Dept: LAB | Facility: HOSPITAL | Age: 69
End: 2024-03-11
Attending: PHYSICIAN ASSISTANT
Payer: MEDICARE

## 2024-03-11 ENCOUNTER — OFFICE VISIT (OUTPATIENT)
Dept: FAMILY MEDICINE | Facility: CLINIC | Age: 69
End: 2024-03-11
Payer: MEDICARE

## 2024-03-11 VITALS
SYSTOLIC BLOOD PRESSURE: 143 MMHG | WEIGHT: 176.69 LBS | OXYGEN SATURATION: 96 % | HEART RATE: 71 BPM | BODY MASS INDEX: 29.44 KG/M2 | HEIGHT: 65 IN | DIASTOLIC BLOOD PRESSURE: 99 MMHG

## 2024-03-11 DIAGNOSIS — R03.0 ELEVATED BP WITHOUT DIAGNOSIS OF HYPERTENSION: ICD-10-CM

## 2024-03-11 DIAGNOSIS — G89.29 CHRONIC BILATERAL LOW BACK PAIN WITHOUT SCIATICA: Primary | ICD-10-CM

## 2024-03-11 DIAGNOSIS — M54.50 CHRONIC BILATERAL LOW BACK PAIN WITHOUT SCIATICA: Primary | ICD-10-CM

## 2024-03-11 DIAGNOSIS — L29.9 ITCHING: ICD-10-CM

## 2024-03-11 DIAGNOSIS — R30.0 DYSURIA: ICD-10-CM

## 2024-03-11 DIAGNOSIS — L98.9 SKIN LESION: ICD-10-CM

## 2024-03-11 DIAGNOSIS — F41.9 ANXIETY: ICD-10-CM

## 2024-03-11 DIAGNOSIS — F51.04 PSYCHOPHYSIOLOGICAL INSOMNIA: ICD-10-CM

## 2024-03-11 LAB
ALBUMIN SERPL BCP-MCNC: 3.8 G/DL (ref 3.5–5.2)
ALP SERPL-CCNC: 95 U/L (ref 55–135)
ALT SERPL W/O P-5'-P-CCNC: 28 U/L (ref 10–44)
ANION GAP SERPL CALC-SCNC: 10 MMOL/L (ref 8–16)
AST SERPL-CCNC: 28 U/L (ref 10–40)
BASOPHILS # BLD AUTO: 0.07 K/UL (ref 0–0.2)
BASOPHILS NFR BLD: 0.7 % (ref 0–1.9)
BILIRUB SERPL-MCNC: 0.5 MG/DL (ref 0.1–1)
BUN SERPL-MCNC: 12 MG/DL (ref 8–23)
CALCIUM SERPL-MCNC: 9.9 MG/DL (ref 8.7–10.5)
CHLORIDE SERPL-SCNC: 105 MMOL/L (ref 95–110)
CO2 SERPL-SCNC: 24 MMOL/L (ref 23–29)
CREAT SERPL-MCNC: 1.2 MG/DL (ref 0.5–1.4)
DIFFERENTIAL METHOD BLD: ABNORMAL
EOSINOPHIL # BLD AUTO: 0.3 K/UL (ref 0–0.5)
EOSINOPHIL NFR BLD: 3 % (ref 0–8)
ERYTHROCYTE [DISTWIDTH] IN BLOOD BY AUTOMATED COUNT: 11.4 % (ref 11.5–14.5)
EST. GFR  (NO RACE VARIABLE): 49.3 ML/MIN/1.73 M^2
GLUCOSE SERPL-MCNC: 106 MG/DL (ref 70–110)
HCT VFR BLD AUTO: 47.4 % (ref 37–48.5)
HGB BLD-MCNC: 15.6 G/DL (ref 12–16)
IMM GRANULOCYTES # BLD AUTO: 0.04 K/UL (ref 0–0.04)
IMM GRANULOCYTES NFR BLD AUTO: 0.4 % (ref 0–0.5)
LYMPHOCYTES # BLD AUTO: 1.9 K/UL (ref 1–4.8)
LYMPHOCYTES NFR BLD: 17.9 % (ref 18–48)
MCH RBC QN AUTO: 32.3 PG (ref 27–31)
MCHC RBC AUTO-ENTMCNC: 32.9 G/DL (ref 32–36)
MCV RBC AUTO: 98 FL (ref 82–98)
MONOCYTES # BLD AUTO: 0.7 K/UL (ref 0.3–1)
MONOCYTES NFR BLD: 6.5 % (ref 4–15)
NEUTROPHILS # BLD AUTO: 7.7 K/UL (ref 1.8–7.7)
NEUTROPHILS NFR BLD: 71.5 % (ref 38–73)
NRBC BLD-RTO: 0 /100 WBC
PLATELET # BLD AUTO: 272 K/UL (ref 150–450)
PMV BLD AUTO: 11.6 FL (ref 9.2–12.9)
POTASSIUM SERPL-SCNC: 4.2 MMOL/L (ref 3.5–5.1)
PROT SERPL-MCNC: 7.5 G/DL (ref 6–8.4)
RBC # BLD AUTO: 4.83 M/UL (ref 4–5.4)
SODIUM SERPL-SCNC: 139 MMOL/L (ref 136–145)
TSH SERPL DL<=0.005 MIU/L-ACNC: 1.89 UIU/ML (ref 0.4–4)
WBC # BLD AUTO: 10.72 K/UL (ref 3.9–12.7)

## 2024-03-11 PROCEDURE — 80053 COMPREHEN METABOLIC PANEL: CPT | Performed by: PHYSICIAN ASSISTANT

## 2024-03-11 PROCEDURE — 81000 URINALYSIS NONAUTO W/SCOPE: CPT | Mod: PO | Performed by: PHYSICIAN ASSISTANT

## 2024-03-11 PROCEDURE — 84443 ASSAY THYROID STIM HORMONE: CPT | Performed by: PHYSICIAN ASSISTANT

## 2024-03-11 PROCEDURE — 93005 ELECTROCARDIOGRAM TRACING: CPT | Mod: S$GLB,,, | Performed by: PHYSICIAN ASSISTANT

## 2024-03-11 PROCEDURE — 36415 COLL VENOUS BLD VENIPUNCTURE: CPT | Mod: PO | Performed by: PHYSICIAN ASSISTANT

## 2024-03-11 PROCEDURE — 99214 OFFICE O/P EST MOD 30 MIN: CPT | Mod: S$GLB,,, | Performed by: PHYSICIAN ASSISTANT

## 2024-03-11 PROCEDURE — 87186 SC STD MICRODIL/AGAR DIL: CPT | Performed by: PHYSICIAN ASSISTANT

## 2024-03-11 PROCEDURE — 85025 COMPLETE CBC W/AUTO DIFF WBC: CPT | Performed by: PHYSICIAN ASSISTANT

## 2024-03-11 PROCEDURE — 87088 URINE BACTERIA CULTURE: CPT | Performed by: PHYSICIAN ASSISTANT

## 2024-03-11 PROCEDURE — 87086 URINE CULTURE/COLONY COUNT: CPT | Performed by: PHYSICIAN ASSISTANT

## 2024-03-11 PROCEDURE — 93010 ELECTROCARDIOGRAM REPORT: CPT | Mod: S$GLB,,, | Performed by: INTERNAL MEDICINE

## 2024-03-11 PROCEDURE — 87077 CULTURE AEROBIC IDENTIFY: CPT | Performed by: PHYSICIAN ASSISTANT

## 2024-03-11 PROCEDURE — 99999 PR PBB SHADOW E&M-EST. PATIENT-LVL V: CPT | Mod: PBBFAC,,, | Performed by: PHYSICIAN ASSISTANT

## 2024-03-11 RX ORDER — DIAZEPAM 10 MG/1
10 TABLET ORAL 2 TIMES DAILY
Qty: 28 TABLET | Refills: 0 | Status: SHIPPED | OUTPATIENT
Start: 2024-03-11 | End: 2024-03-25 | Stop reason: SDUPTHER

## 2024-03-11 RX ORDER — MUPIROCIN 20 MG/G
OINTMENT TOPICAL 2 TIMES DAILY
Qty: 30 G | Refills: 0 | Status: SHIPPED | OUTPATIENT
Start: 2024-03-11 | End: 2024-03-25

## 2024-03-11 RX ORDER — HYDROXYZINE HYDROCHLORIDE 25 MG/1
25 TABLET, FILM COATED ORAL 3 TIMES DAILY PRN
Qty: 90 TABLET | Refills: 3 | Status: SHIPPED | OUTPATIENT
Start: 2024-03-11

## 2024-03-11 RX ORDER — NETARSUDIL 0.2 MG/ML
SOLUTION/ DROPS OPHTHALMIC; TOPICAL
COMMUNITY
Start: 2024-03-05

## 2024-03-11 RX ORDER — TIZANIDINE 4 MG/1
4 TABLET ORAL EVERY 8 HOURS
Qty: 30 TABLET | Refills: 0 | Status: SHIPPED | OUTPATIENT
Start: 2024-03-11 | End: 2024-03-25

## 2024-03-11 RX ORDER — MELOXICAM 15 MG/1
15 TABLET ORAL DAILY
Qty: 30 TABLET | Refills: 0 | Status: SHIPPED | OUTPATIENT
Start: 2024-03-11 | End: 2024-03-25 | Stop reason: SDUPTHER

## 2024-03-11 RX ORDER — SULFAMETHOXAZOLE AND TRIMETHOPRIM 800; 160 MG/1; MG/1
1 TABLET ORAL 2 TIMES DAILY
Qty: 20 TABLET | Refills: 0 | Status: SHIPPED | OUTPATIENT
Start: 2024-03-11 | End: 2024-03-25

## 2024-03-11 NOTE — PROGRESS NOTES
Assessment/Plan:    Problem List Items Addressed This Visit          Psychiatric    Anxiety    Relevant Medications    diazePAM (VALIUM) 10 MG Tab       Derm    Itching    Relevant Medications    hydrOXYzine HCL (ATARAX) 25 MG tablet    Other Relevant Orders    TSH       Orthopedic    Lumbago without sciatica - Primary    Overview     -chronic symptoms of bilateral lower back pain  -s/p lumbar spinal fusion in 2020  -no alarm symptoms or signs present on exam  -will obtain lumbar XR today  -continue conservative treatment, rest, ice/heat applications, PRN anti-inflammatory medication  -consider PT and/or pain management referral if symptoms persist  -ER precautions for severe or worsening of symptoms           Relevant Medications    tiZANidine (ZANAFLEX) 4 MG tablet    meloxicam (MOBIC) 15 MG tablet    Other Relevant Orders    X-Ray Lumbar Spine 2 Or 3 Views       Other    Insomnia    Overview     -is on valium chronically  -The patient was checked in the [facility] Board of Pharmacy's Prescription Monitoring Program. There appears to be no incongruities with the patient's verbalized history.   -denies adverse effects of medication  -has tried multiple OTC medication with minimal benefit  -discussed importance of good sleep hygiene practices           Relevant Medications    diazePAM (VALIUM) 10 MG Tab     Other Visit Diagnoses       Skin lesion        Relevant Medications    mupirocin (BACTROBAN) 2 % ointment    sulfamethoxazole-trimethoprim 800-160mg (BACTRIM DS) 800-160 mg Tab    Other Relevant Orders    Ambulatory referral/consult to Dermatology    Dysuria        Relevant Orders    Urinalysis, Reflex to Urine Culture Urine, Clean Catch    Elevated BP without diagnosis of hypertension      -elevated BP noted on exam  -currently asymptomatic  -checking labs, UA and EKG today  -recommend lifestyle modification with low sodium diet and exercise   -discussed hypertension disease course and importance of  treating high blood pressure  -patient understood and advised of risk of untreated blood pressure. ER precautions were given   for symptoms of hypertensive urgency and emergency.      Relevant Orders    Urinalysis, Reflex to Urine Culture Urine, Clean Catch    IN OFFICE EKG 12-LEAD (to Muse)    CBC Auto Differential    Comprehensive Metabolic Panel    TSH            Follow up in about 2 weeks (around 3/25/2024), or if symptoms worsen or fail to improve, for with PCP.    Maryann Dumont PA-C  _____________________________________________________________________________________________________________________________________________________    CC: low back pain, dysuria, and skin lesions    HPI: Patient is in clinic today as an established patient here for low back pain. Patient has a chronic history of low back pain with worsening of pain over the past few weeks. Denies recent trauma or injuries. Pain is located in her bilateral lumbosacral region. Denies lower extremity numbness, tingling, weakness or urinary/bowel incontinence. She stated the pain is worse when moving in certain positions. She has been taking Ibuprofen with minimal relief of pain. She does have a history of a lumbar spinal fusion in 2020.     Patient also reports dysuria over the past few days. Denies fever, chills, urgency, frequency, hematuria, abdominal pain or vaginal symptoms. Denies history of recurrent UTI or pyelonephritis.    Patient reports multiple skin lesions located on her face, scalp, arms, legs and trunk. Lesions come and go and have been present for several months. She has associated pain and pruritus. Also reports intermittent bleeding and drainage. Denies fever. She has not tried anything for relief of symptoms. She has previously seen dermatology for this in which she was diagnosed with neurodermatitis. She does have a history of anxiety, depression and insomnia and was previously on Valium, however, she has been out of the  "medication for several weeks. She stated that this medication typically helps with skin lesions/itching.     Patient also has an elevated blood pressure noted on the exam today. Denies chest pain, palpitations, shortness of breath, dyspnea on exertion, left arm or neck pain, nausea, vomiting, diaphoresis, PND and orthopnea. The patient does not have a history of hypertension and is not on medications that would increase the blood pressure at this time.    No other complaints today.     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     Past Surgical History:   Procedure Laterality Date    CATARACT EXTRACTION      FOREARM SURGERY      elbow, upper & lower arm, repaired tendons    HIP SURGERY Left 11/18/2013    replacement     KNEE SURGERY      due to fracture    LUMBAR FUSION  08/2020    RETINAL DETACHMENT SURGERY Right     WRIST FRACTURE SURGERY  2011    right     Review of patient's allergies indicates:   Allergen Reactions    Gabapentin      Feel wierd    Morphine Other (See Comments)     Lowers Blood Pressure   Other reaction(s): Psychotic paranoia    Prednisone      Hyperactive     Trazodone      Feels "shitty"    Bupropion hcl Anxiety     Social History     Tobacco Use    Smoking status: Former     Current packs/day: 0.50     Average packs/day: 0.5 packs/day for 40.0 years (20.0 ttl pk-yrs)     Types: Cigarettes, Vaping with nicotine    Smokeless tobacco: Never    Tobacco comments:     Not smoking cigarettes only vaping.    Substance Use Topics    Alcohol use: Not Currently     Comment: socially    Drug use: No     Family History   Problem Relation Age of Onset    Coronary artery disease Mother     Aortic aneurysm Mother     COPD Mother     Stroke Mother     Coronary artery disease Father     Melanoma Neg Hx      Current " Outpatient Medications on File Prior to Visit   Medication Sig Dispense Refill    brimonidine 0.15 % OPTH DROP (ALPHAGAN) 0.15 % ophthalmic solution Place 1 drop into both eyes 3 (three) times daily. 15 mL 1    dorzolamide-timolol 2-0.5% (COSOPT) 22.3-6.8 mg/mL ophthalmic solution Place 1 drop into both eyes 3 (three) times daily. 10 mL 3    latanoprost 0.005 % ophthalmic solution put ONE drop IN EACH EYE EVERY DAY 2.5 mL 11    RHOPRESSA 0.02 % ophthalmic solution SMARTSI Drop(s) Right Eye Every Evening      [DISCONTINUED] hydrOXYzine HCL (ATARAX) 25 MG tablet Take 1 tablet (25 mg total) by mouth 3 (three) times daily as needed for Itching. 90 tablet 3    cyanocobalamin 1,000 mcg/mL injection Inject 1 mL (1,000 mcg total) into the muscle every 30 days. (Patient not taking: Reported on 10/20/2023) 10 mL 3    pantoprazole (PROTONIX) 40 MG tablet Take 40 mg by mouth.      rosuvastatin (CRESTOR) 10 MG tablet Take 1 tablet (10 mg total) by mouth every evening. (Patient not taking: Reported on 10/20/2023) 90 tablet 3    temazepam (RESTORIL) 30 mg capsule Take 1 capsule (30 mg total) by mouth nightly as needed for Insomnia. (Patient not taking: Reported on 2023) 30 capsule 0    [DISCONTINUED] amoxicillin-clavulanate 875-125mg (AUGMENTIN) 875-125 mg per tablet Take 1 tablet by mouth every 12 (twelve) hours.      [DISCONTINUED] diazePAM (VALIUM) 10 MG Tab Take 1 tablet (10 mg total) by mouth 2 (two) times a day. (Patient not taking: Reported on 10/20/2023) 60 tablet 5    [DISCONTINUED] HYDROcodone-acetaminophen (NORCO) 5-325 mg per tablet Take 1 tablet by mouth every 6 (six) hours as needed for Pain. (Patient not taking: Reported on 3/11/2024) 20 tablet 0    [DISCONTINUED] ketorolac (TORADOL) 10 mg tablet Take 1 tablet (10 mg total) by mouth every 6 (six) hours as needed for Pain. (Patient not taking: Reported on 2023) 30 tablet 3     No current facility-administered medications on file prior to visit.  "      Review of Systems   Constitutional:  Negative for chills, diaphoresis, fatigue and fever.   HENT:  Negative for congestion, ear pain, postnasal drip, sinus pain and sore throat.    Eyes:  Negative for pain and redness.   Respiratory:  Negative for cough, chest tightness and shortness of breath.    Cardiovascular:  Negative for chest pain and leg swelling.   Gastrointestinal:  Negative for abdominal pain, constipation, diarrhea, nausea and vomiting.   Genitourinary:  Positive for dysuria. Negative for flank pain, frequency, hematuria and urgency.   Musculoskeletal:  Positive for back pain. Negative for arthralgias and joint swelling.   Skin:  Negative for rash.   Neurological:  Negative for dizziness, syncope and headaches.   Psychiatric/Behavioral:  Negative for dysphoric mood. The patient is not nervous/anxious.        Vitals:    03/11/24 1553 03/11/24 1636   BP: (!) 185/101 (!) 143/99   Pulse: 71    SpO2: 96%    Weight: 80.2 kg (176 lb 11.2 oz)    Height: 5' 5" (1.651 m)        Wt Readings from Last 3 Encounters:   03/11/24 80.2 kg (176 lb 11.2 oz)   10/20/23 81.3 kg (179 lb 4.8 oz)   07/26/23 75.4 kg (166 lb 3.2 oz)       Physical Exam  Constitutional:       General: She is not in acute distress.     Appearance: Normal appearance. She is well-developed.   HENT:      Head: Normocephalic and atraumatic.   Eyes:      Conjunctiva/sclera: Conjunctivae normal.   Cardiovascular:      Rate and Rhythm: Normal rate and regular rhythm.      Pulses: Normal pulses.      Heart sounds: Normal heart sounds. No murmur heard.  Pulmonary:      Effort: Pulmonary effort is normal. No respiratory distress.      Breath sounds: Normal breath sounds.   Abdominal:      General: Bowel sounds are normal. There is no distension.      Palpations: Abdomen is soft.      Tenderness: There is no abdominal tenderness.   Musculoskeletal:         General: Normal range of motion.      Cervical back: Normal range of motion and neck supple.      " Lumbar back: Tenderness present. No swelling, deformity or bony tenderness. Normal range of motion.   Skin:     General: Skin is warm and dry.      Findings: No rash.      Comments: Multiple skin lesions present on face, scalp, arms, legs and trunk   Neurological:      General: No focal deficit present.      Mental Status: She is alert and oriented to person, place, and time.   Psychiatric:         Mood and Affect: Mood normal.         Behavior: Behavior normal.         Health Maintenance   Topic Date Due    TETANUS VACCINE  Never done    Colorectal Cancer Screening  Never done    Shingles Vaccine (2 of 2) 12/11/2020    Mammogram  05/13/2021    DEXA Scan  06/09/2023    Lipid Panel  05/22/2028    Hepatitis C Screening  Completed

## 2024-03-12 ENCOUNTER — TELEPHONE (OUTPATIENT)
Dept: FAMILY MEDICINE | Facility: CLINIC | Age: 69
End: 2024-03-12
Payer: MEDICARE

## 2024-03-12 LAB
BACTERIA #/AREA URNS HPF: ABNORMAL /HPF
BILIRUB UR QL STRIP: NEGATIVE
CAOX CRY URNS QL MICRO: ABNORMAL
CLARITY UR: CLEAR
COLOR UR: YELLOW
GLUCOSE UR QL STRIP: NEGATIVE
HGB UR QL STRIP: ABNORMAL
KETONES UR QL STRIP: NEGATIVE
LEUKOCYTE ESTERASE UR QL STRIP: ABNORMAL
MICROSCOPIC COMMENT: ABNORMAL
NITRITE UR QL STRIP: NEGATIVE
OHS QRS DURATION: 86 MS
OHS QTC CALCULATION: 422 MS
PH UR STRIP: 6 [PH] (ref 5–8)
PROT UR QL STRIP: ABNORMAL
RBC #/AREA URNS HPF: 2 /HPF (ref 0–4)
SP GR UR STRIP: 1.02 (ref 1–1.03)
SQUAMOUS #/AREA URNS HPF: 6 /HPF
URN SPEC COLLECT METH UR: ABNORMAL
WBC #/AREA URNS HPF: >100 /HPF (ref 0–5)

## 2024-03-12 NOTE — PROGRESS NOTES
Results have been released via [employer]. Please verify that these have been viewed by patient. If not, please call patient with results.     I have sent a message to them with the following interpretation (see below).    I have reviewed your recent blood work.     CBC NORMAL-The CBC appears to be stable at this time with no sign of major anemia, abnormal white count or platelet abnormality.  CMP/BMP NORMAL-The electrolytes all appear stable at this time. This includes kidney functions along with routine electrolytes like sugar, potassium and sodium. The liver enzymes were noted to be stable also.  TSH NORMAL-The TSH screening indicated a normal function of the thyroid.    Please do not hesitate to call or message with any additional questions or concerns.    Maryann Dumont PA-C

## 2024-03-12 NOTE — TELEPHONE ENCOUNTER
03/12/2024 12:49 PM   Called pt to help her r/s her x-ray. She stated she has not talked to her daughter to reschedule yet. She asked that I call her tomorrow to r/s the appt.

## 2024-03-12 NOTE — PROGRESS NOTES
Results have been released via [employer]. Please verify that these have been viewed by patient. If not, please call patient with results.     I have sent a message to them with the following interpretation (see below).    I have reviewed your recent urinalysis which showed bacteria concerning for infection. Please start the antibiotic as prescribed at your visit. Urine culture pending results.     Please do not hesitate to call or message with any additional questions or concerns.    Maryann Dumont PA-C

## 2024-03-13 ENCOUNTER — TELEPHONE (OUTPATIENT)
Dept: FAMILY MEDICINE | Facility: CLINIC | Age: 69
End: 2024-03-13
Payer: MEDICARE

## 2024-03-14 LAB — BACTERIA UR CULT: ABNORMAL

## 2024-03-15 NOTE — PROGRESS NOTES
Results have been released via [employer]. Please verify that these have been viewed by patient. If not, please call patient with results.     I have sent a message to them with the following interpretation (see below).    I have reviewed your recent urine culture.    Bacteria is sensitive to the antibiotic prescribed. Please complete the medication and follow up if symptoms persist.    Please do not hesitate to call or message with any additional questions or concerns.    Maryann Dumont PA-C

## 2024-03-21 ENCOUNTER — HOSPITAL ENCOUNTER (OUTPATIENT)
Dept: RADIOLOGY | Facility: HOSPITAL | Age: 69
Discharge: HOME | End: 2024-03-21
Attending: PHYSICIAN ASSISTANT
Payer: MEDICARE

## 2024-03-21 DIAGNOSIS — M54.50 CHRONIC BILATERAL LOW BACK PAIN WITHOUT SCIATICA: ICD-10-CM

## 2024-03-21 DIAGNOSIS — G89.29 CHRONIC BILATERAL LOW BACK PAIN WITHOUT SCIATICA: ICD-10-CM

## 2024-03-21 PROCEDURE — 72100 X-RAY EXAM L-S SPINE 2/3 VWS: CPT | Mod: 26,,, | Performed by: RADIOLOGY

## 2024-03-21 PROCEDURE — 72100 X-RAY EXAM L-S SPINE 2/3 VWS: CPT | Mod: TC,PO

## 2024-03-22 NOTE — PROGRESS NOTES
Results have been released via [employer]. Please verify that these have been viewed by patient. If not, please call patient with results.     I have sent a message to them with the following interpretation (see below).    I have reviewed your recent lumbar XR which appears to be stable when compared to previous imaging. Your hardware is in place. There was also an abdominal aortic aneurysm seen as noted on previous imaging. Please follow up with your PCP as recommended.     Please do not hesitate to call or message with any additional questions or concerns.    Maryann Dumont PA-C

## 2024-03-25 ENCOUNTER — OFFICE VISIT (OUTPATIENT)
Dept: FAMILY MEDICINE | Facility: CLINIC | Age: 69
End: 2024-03-25
Payer: MEDICARE

## 2024-03-25 VITALS
OXYGEN SATURATION: 98 % | BODY MASS INDEX: 29.29 KG/M2 | HEIGHT: 65 IN | WEIGHT: 175.81 LBS | DIASTOLIC BLOOD PRESSURE: 88 MMHG | SYSTOLIC BLOOD PRESSURE: 124 MMHG | HEART RATE: 85 BPM

## 2024-03-25 DIAGNOSIS — M46.96 UNSPECIFIED INFLAMMATORY SPONDYLOPATHY, LUMBAR REGION: ICD-10-CM

## 2024-03-25 DIAGNOSIS — F33.2 SEVERE EPISODE OF RECURRENT MAJOR DEPRESSIVE DISORDER, WITHOUT PSYCHOTIC FEATURES: Primary | ICD-10-CM

## 2024-03-25 DIAGNOSIS — F51.04 PSYCHOPHYSIOLOGICAL INSOMNIA: ICD-10-CM

## 2024-03-25 DIAGNOSIS — G89.29 CHRONIC BILATERAL LOW BACK PAIN WITHOUT SCIATICA: ICD-10-CM

## 2024-03-25 DIAGNOSIS — R56.9 SEIZURES: ICD-10-CM

## 2024-03-25 DIAGNOSIS — M54.50 CHRONIC BILATERAL LOW BACK PAIN WITHOUT SCIATICA: ICD-10-CM

## 2024-03-25 DIAGNOSIS — N18.31 CHRONIC KIDNEY DISEASE, STAGE 3A: ICD-10-CM

## 2024-03-25 DIAGNOSIS — E78.49 OTHER HYPERLIPIDEMIA: ICD-10-CM

## 2024-03-25 DIAGNOSIS — L98.9 SKIN LESION: ICD-10-CM

## 2024-03-25 DIAGNOSIS — E66.3 OVERWEIGHT (BMI 25.0-29.9): ICD-10-CM

## 2024-03-25 DIAGNOSIS — L28.0 NEURODERMATITIS: ICD-10-CM

## 2024-03-25 DIAGNOSIS — F41.9 ANXIETY: ICD-10-CM

## 2024-03-25 DIAGNOSIS — I71.40 ABDOMINAL AORTIC ANEURYSM (AAA) WITHOUT RUPTURE, UNSPECIFIED PART: ICD-10-CM

## 2024-03-25 PROCEDURE — 99214 OFFICE O/P EST MOD 30 MIN: CPT | Mod: S$GLB,,, | Performed by: STUDENT IN AN ORGANIZED HEALTH CARE EDUCATION/TRAINING PROGRAM

## 2024-03-25 PROCEDURE — 99999 PR PBB SHADOW E&M-EST. PATIENT-LVL V: CPT | Mod: PBBFAC,,, | Performed by: STUDENT IN AN ORGANIZED HEALTH CARE EDUCATION/TRAINING PROGRAM

## 2024-03-25 RX ORDER — METHOCARBAMOL 500 MG/1
500 TABLET, FILM COATED ORAL 4 TIMES DAILY
Qty: 40 TABLET | Refills: 2 | Status: SHIPPED | OUTPATIENT
Start: 2024-03-25 | End: 2024-04-04

## 2024-03-25 RX ORDER — ROSUVASTATIN CALCIUM 10 MG/1
10 TABLET, COATED ORAL NIGHTLY
Qty: 90 TABLET | Refills: 3 | Status: SHIPPED | OUTPATIENT
Start: 2024-03-25 | End: 2025-03-25

## 2024-03-25 RX ORDER — MELOXICAM 15 MG/1
15 TABLET ORAL DAILY
Qty: 30 TABLET | Refills: 1 | Status: SHIPPED | OUTPATIENT
Start: 2024-03-25

## 2024-03-25 RX ORDER — DIAZEPAM 10 MG/1
10 TABLET ORAL 2 TIMES DAILY
Qty: 60 TABLET | Refills: 5 | Status: SHIPPED | OUTPATIENT
Start: 2024-03-25 | End: 2024-04-24

## 2024-03-25 RX ORDER — MUPIROCIN 20 MG/G
OINTMENT TOPICAL 2 TIMES DAILY
Qty: 30 G | Refills: 1 | Status: SHIPPED | OUTPATIENT
Start: 2024-03-25 | End: 2024-04-04

## 2024-03-25 NOTE — PROGRESS NOTES
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 She is now using dove soap  Worse with stress; better with valium 10mg BID   Prn atarax  Referral to derm         Relevant Orders    Ambulatory referral/consult to Dermatology       Cardiac/Vascular    Abdominal aortic aneurysm (AAA) without rupture    Overview     US AAA 2022  Abdominal aortic aneurysm     CT ab/pelvis 2023 for hematochezia  Infrarenal abdominal aortic aneurysm measuring 4.5 x 4.4 cm in greatest axial dimension with moderate mural thrombus  - referral to vascular  - ED precautions discussed with pt and/or pt family. voiced understanding.               Relevant Orders    Ambulatory referral/consult to Vascular Surgery    Other hyperlipidemia    Overview     -chronic condition. Currently stable.      Hyperlipidemia Medications               rosuvastatin (CRESTOR) 10 MG tablet Take 1 tablet (10 mg total) by mouth every evening.            Lab Results   Component Value Date    CHOL 258 (H) 05/22/2023    CHOL 247 (H) 12/01/2022    CHOL 252 (H) 04/13/2022     Lab Results   Component Value Date    HDL 43 05/22/2023    HDL 46 12/01/2022    HDL 71 04/13/2022     Lab Results   Component Value Date    LDLCALC 177.6 (H) 05/22/2023    LDLCALC 158.4 12/01/2022    LDLCALC 128.8 04/13/2022     Lab Results   Component Value Date    TRIG 187  (H) 05/22/2023    TRIG 213 (H) 12/01/2022    TRIG 261 (H) 04/13/2022     Lab Results   Component Value Date    CHOLHDL 16.7 (L) 05/22/2023    CHOLHDL 18.6 (L) 12/01/2022    CHOLHDL 28.2 04/13/2022          The 10-year ASCVD risk score (Gio HURST, et al., 2019) is: 8.5%    Values used to calculate the score:      Age: 69 years      Sex: Female      Is Non- : No      Diabetic: No      Tobacco smoker: No      Systolic Blood Pressure: 124 mmHg      Is BP treated: No      HDL Cholesterol: 43 mg/dL      Total Cholesterol: 258 mg/dL           Relevant Medications    rosuvastatin (CRESTOR) 10 MG tablet       Renal/    Chronic kidney disease, stage 3a    Overview     BMP  Lab Results   Component Value Date     03/11/2024    K 4.2 03/11/2024     03/11/2024    CO2 24 03/11/2024    BUN 12 03/11/2024    CREATININE 1.2 03/11/2024    CALCIUM 9.9 03/11/2024    ANIONGAP 10 03/11/2024    EGFRNORACEVR 49.3 (A) 03/11/2024   Chronic hx; stable             Endocrine    Overweight (BMI 25.0-29.9)    Overview     Wt Readings from Last 3 Encounters:   03/25/24 1405 79.7 kg (175 lb 12.8 oz)   03/11/24 1553 80.2 kg (176 lb 11.2 oz)   10/20/23 1107 81.3 kg (179 lb 4.8 oz)         General weight loss/lifestyle modification strategies discussed: limit sugary drinks, exercise 3-5x per week  Informal exercise measures discussed, e.g. taking stairs instead of elevator.                    Orthopedic    Lumbago without sciatica    Overview     -chronic symptoms of bilateral lower back pain  -s/p lumbar spinal fusion in 2020  -no alarm symptoms or signs present on exam  - referral to PT    XR LUMBAR SPINE COMPLETE 5 VIEW     CLINICAL HISTORY:  Low back pain, unspecified     TECHNIQUE:  AP, lateral, spot and bilateral oblique views of the lumbar spine were performed.     COMPARISON:  Radiographs 08/06/2014.  MRI 06/25/2020.     FINDINGS:  Operative changes of posterior instrumented fusion spanning L4-S1 and  anterior interbody fusion spanning L4-L5 and L5-S1.  Hardware demonstrates satisfactory alignment without evidence of fracture or obvious loosening.  Interbody spacers appear to be well seated without evidence of subsidence.  Vertebral body heights are preserved.  No displaced fractures or bony destructive changes.  Progressive adjacent segment degenerative disc height loss at L3-L4 which is moderate.  Mild degenerative disc height loss at L2-L3.  Osseous alignment is maintained.  Dextrocurvature partially imaged left hip arthroplasty changes.     Impression:     1. Routine postoperative appearance of the lumbosacral spine.  No acute hardware complication or bony process.  Progressive adjacent segment degenerative changes.           Relevant Medications    methocarbamoL (ROBAXIN) 500 MG Tab    meloxicam (MOBIC) 15 MG tablet    Other Relevant Orders    Ambulatory referral/consult to Physical/Occupational Therapy    Unspecified inflammatory spondylopathy, lumbar region    Overview     Chronic hx; no red flags  Prn robaxin and mobic   Referral PT            Other    Insomnia    Overview     -is on valium chronically  -The patient was checked in the [facility]acy's Prescription Monitoring Program. There appears to be no incongruities with the patient's verbalized history.   -denies adverse effects of medication  -has tried multiple OTC medication with minimal benefit  -discussed importance of good sleep hygiene practices           Relevant Medications    diazePAM (VALIUM) 10 MG Tab     Other Visit Diagnoses       Skin lesion        Relevant Medications    mupirocin (BACTROBAN) 2 % ointment              Patient ID: Fauzia Cornelius is a 69 y.o. female.    Chief Complaint:  follow up    Has a hx of  has a past medical history of Abdominal aortic aneurysm (AAA) 3.0 cm to 5.0 cm in diameter in female, Arthritis, Glaucoma, Lumbago without sciatica, PTSD (post-traumatic stress disorder), and Seizures.  "    Reports worsening neurodermatitis to BLE, back, BUE - no longer using dial soap, anamaria dish soap. She is now using dove soap. Feeling more moisturized. Prn atarax helps.       Taking all meds as prescribed. Denies fevers, chills, chest pain, SOB, fatigue, abdominal pain, nausea, vomiting, dysuria, hematuria, hematochezia, or melena.     Health Maintenance Topics with due status: Not Due       Topic Last Completion Date    Hemoglobin A1c (Prediabetes) 05/22/2023    Lipid Panel 05/22/2023        ==============================================  History reviewed.   Health Maintenance Due   Topic Date Due    TETANUS VACCINE  Never done    Colorectal Cancer Screening  Never done    RSV Vaccine (Age 60+ and Pregnant patients) (1 - 1-dose 60+ series) Never done    Shingles Vaccine (2 of 2) 12/11/2020    Mammogram  05/13/2021    DEXA Scan  06/09/2023    Influenza Vaccine (1) 09/01/2023    COVID-19 Vaccine (4 - 2023-24 season) 09/01/2023    Cervical Cancer Screening  12/04/2023       Past Medical History:  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     Past Surgical History:   Procedure Laterality Date    CATARACT EXTRACTION      FOREARM SURGERY      elbow, upper & lower arm, repaired tendons    HIP SURGERY Left 11/18/2013    replacement     KNEE SURGERY      due to fracture    LUMBAR FUSION  08/2020    RETINAL DETACHMENT SURGERY Right     WRIST FRACTURE SURGERY  2011    right     Review of patient's allergies indicates:   Allergen Reactions    Gabapentin      Feel wierd    Morphine Other (See Comments)     Lowers Blood Pressure   Other reaction(s): Psychotic paranoia    Prednisone      Hyperactive     Trazodone      Feels "shitty"    Bupropion hcl Anxiety     Current Outpatient Medications on File Prior to Visit   Medication Sig " Dispense Refill    brimonidine 0.15 % OPTH DROP (ALPHAGAN) 0.15 % ophthalmic solution Place 1 drop into both eyes 3 (three) times daily. 15 mL 1    hydrOXYzine HCL (ATARAX) 25 MG tablet Take 1 tablet (25 mg total) by mouth 3 (three) times daily as needed for Itching. 90 tablet 3    latanoprost 0.005 % ophthalmic solution put ONE drop IN EACH EYE EVERY DAY 2.5 mL 11    pantoprazole (PROTONIX) 40 MG tablet Take 40 mg by mouth.      RHOPRESSA 0.02 % ophthalmic solution SMARTSI Drop(s) Right Eye Every Evening      [DISCONTINUED] diazePAM (VALIUM) 10 MG Tab Take 1 tablet (10 mg total) by mouth 2 (two) times a day. for 14 days 28 tablet 0    [DISCONTINUED] meloxicam (MOBIC) 15 MG tablet Take 1 tablet (15 mg total) by mouth once daily. 30 tablet 0    cyanocobalamin 1,000 mcg/mL injection Inject 1 mL (1,000 mcg total) into the muscle every 30 days. (Patient not taking: Reported on 3/25/2024) 10 mL 3    dorzolamide-timolol 2-0.5% (COSOPT) 22.3-6.8 mg/mL ophthalmic solution Place 1 drop into both eyes 3 (three) times daily. 10 mL 3    [DISCONTINUED] mupirocin (BACTROBAN) 2 % ointment Apply topically 2 (two) times daily. for 10 days 30 g 0    [DISCONTINUED] rosuvastatin (CRESTOR) 10 MG tablet Take 1 tablet (10 mg total) by mouth every evening. (Patient not taking: Reported on 10/20/2023) 90 tablet 3    [DISCONTINUED] sulfamethoxazole-trimethoprim 800-160mg (BACTRIM DS) 800-160 mg Tab Take 1 tablet by mouth 2 (two) times daily. for 10 days 20 tablet 0    [DISCONTINUED] temazepam (RESTORIL) 30 mg capsule Take 1 capsule (30 mg total) by mouth nightly as needed for Insomnia. (Patient not taking: Reported on 2023) 30 capsule 0    [DISCONTINUED] tiZANidine (ZANAFLEX) 4 MG tablet Take 1 tablet (4 mg total) by mouth every 8 (eight) hours. for 10 days 30 tablet 0     No current facility-administered medications on file prior to visit.     Social History     Socioeconomic History    Marital status:    Tobacco Use     Smoking status: Former     Current packs/day: 0.50     Average packs/day: 0.5 packs/day for 40.0 years (20.0 ttl pk-yrs)     Types: Cigarettes, Vaping with nicotine    Smokeless tobacco: Never    Tobacco comments:     Not smoking cigarettes only vaping.    Substance and Sexual Activity    Alcohol use: Not Currently     Comment: socially    Drug use: No   Other Topics Concern    Are you pregnant or think you may be? No     Social Determinants of Health     Financial Resource Strain: High Risk (11/4/2022)    Overall Financial Resource Strain (CARDIA)     Difficulty of Paying Living Expenses: Very hard   Food Insecurity: Food Insecurity Present (11/4/2022)    Hunger Vital Sign     Worried About Running Out of Food in the Last Year: Sometimes true     Ran Out of Food in the Last Year: Sometimes true   Transportation Needs: Unmet Transportation Needs (11/4/2022)    PRAPARE - Transportation     Lack of Transportation (Medical): Yes     Lack of Transportation (Non-Medical): Yes   Physical Activity: Inactive (11/4/2022)    Exercise Vital Sign     Days of Exercise per Week: 0 days     Minutes of Exercise per Session: 0 min   Stress: Stress Concern Present (11/4/2022)    [employer] - Occupational Stress Questionnaire     Feeling of Stress : Very much   Social Connections: Unknown (11/4/2022)    Social Connection and Isolation Panel [NHANES]     Frequency of Communication with Friends and Family: More than three times a week     Frequency of Social Gatherings with Friends and Family: Once a week     Active Member of Clubs or Organizations: No     Attends Club or Organization Meetings: Never     Marital Status:    Housing Stability: High Risk (11/4/2022)    Housing Stability Vital Sign     Unable to Pay for Housing in the Last Year: Yes     Number of Places Lived in the Last Year: 1     Unstable Housing in the Last Year: No     Family History   Problem Relation Age of Onset    Coronary  artery disease Mother     Aortic aneurysm Mother     COPD Mother     Stroke Mother     Coronary artery disease Father     Melanoma Neg Hx           Review of Systems   12 point review of systems per hpi, otherwise negative         Objective:    Nursing note and vitals reviewed.  Vitals:    03/25/24 1405   BP: 124/88   Pulse: 85     Body mass index is 29.25 kg/m².     Physical Exam   Constitutional: SHE is oriented to person, place, and time. She appears well-developed and well-nourished. No distress.   HENT: WNL  Head: Normocephalic and atraumatic.   Eyes: Pupils are equal, round, and reactive to light. EOM are normal.   Neck: Normal range of motion. Neck supple.   Cardiovascular: Normal rate, regular rhythm, normal heart sounds and intact distal pulses.   No murmur heard.  Pulmonary/Chest: Effort normal and breath sounds normal. No respiratory distress. She has no wheezes.   GI: soft, non distended, no ttp, no rebound/guarding  Musculoskeletal: Normal range of motion. She exhibits no edema. +lumbar ttp, no step- offs, no point ttp  Neurological: She is alert and oriented to person, place, and time. No cranial nerve deficit.   Skin: Skin is warm and dry. Capillary refill takes less than 2 seconds. Healing shallow ulcerations to BUE/BLE   Psychiatric: She has a normal mood and affect. Her behavior is normal.        Vandana Arriaga MD    We Offer Telehealth & Same Day Appointments!   Book your Telehealth appointment with me through my nurse or   Clinic appointments on [employer]!  [phone]     To Schedule appointments online, go to [employer]: [email or web address]

## 2024-03-25 NOTE — PATIENT INSTRUCTIONS
Seng Cage,     If you are due for any health screening(s) below please notify me so we can arrange them to be ordered and scheduled. Most healthy patients at your age complete them, but you are free to accept or refuse.     If you can't do it, I'll definitely understand. If you can, I'd certainly appreciate it!    Tests to Keep You Healthy    Mammogram: ORDERED BUT NOT SCHEDULED  Colon Cancer Screening: ORDERED      Schedule your breast cancer screening today     Breast cancer is the second most common cancer in women,  and the second leading cause of death from cancer. Mammograms can detect breast cancer early, which significantly increases the chances of curing the cancer.       Our records indicate that you may be overdue for breast cancer screening. Cancer screenings save lives, so schedule yours today to stay healthy.     If you recently had a mammogram performed outside of [facility], please let your Health care team know so that they can update your health record.        Its time for your colon cancer screening     Colorectal cancer is one of the leading causes of cancer death for men and women but it doesnt have to be. Screenings can prevent colorectal cancer or find it early enough to treat and cure the disease.     Our records indicate that you may be overdue for colon cancer screening. A colonoscopy or stool screening test can help identify patients at risk for developing colon cancer. Cancer screenings save lives, so schedule yours today to stay healthy.     A colonoscopy is the preferred test for detecting colon cancer. It is needed only once every 10 years if results are negative. While you are sedated, a flexible, lighted tube with a tiny camera is inserted into the rectum and advanced through the colon to look for cancers.     An alternative screening test that is used at home and returned to the lab may also be used. It detects hidden blood in bowel movements which could indicate cancer  in the colon. If results are positive, you will need a colonoscopy to determine if the blood is a sign of cancer. This type of follow up (diagnostic) colonoscopy usually requires additional copays as required by your insurance provider.     If you recently had your colon cancer screening performed outside of [facility], please let your Health care team know so that they can update your health record. Please contact your PCP if you have any questions.

## 2024-03-26 ENCOUNTER — TELEPHONE (OUTPATIENT)
Dept: FAMILY MEDICINE | Facility: CLINIC | Age: 69
End: 2024-03-26
Payer: MEDICARE

## 2024-03-26 NOTE — TELEPHONE ENCOUNTER
----- Message from Lesley Carmona sent at 3/26/2024 12:34 PM CDT -----  Contact: Pt  [phone]  Patient is returning a phone call.  Who left a message for the patient: Jeanette López  Does patient know what this is regarding:  yes  Would you like a call back, or a response through your [facility] portal?: call  Comments:

## 2024-03-26 NOTE — TELEPHONE ENCOUNTER
Pt having concerns with her abdominal aorta, questioned if she should continue physical therapy for her back? Please advise.

## 2024-03-26 NOTE — TELEPHONE ENCOUNTER
----- Message from Sunshine Tarango sent at 3/25/2024  5:47 PM CDT -----  Type:  Patient Returning Call    Who Called:pt  Does the patient know what this is regarding?:returning call  Would the patient rather a call back or a response via [email or web address]? call  Best Call Back Number: [phone]  Additional Information:

## 2024-03-28 ENCOUNTER — NURSE TRIAGE (OUTPATIENT)
Dept: ADMINISTRATIVE | Facility: CLINIC | Age: 69
End: 2024-03-28
Payer: MEDICARE

## 2024-03-28 ENCOUNTER — TELEPHONE (OUTPATIENT)
Dept: FAMILY MEDICINE | Facility: CLINIC | Age: 69
End: 2024-03-28
Payer: MEDICARE

## 2024-03-28 DIAGNOSIS — N18.31 CKD STAGE 3A, GFR 45-59 ML/MIN: Primary | ICD-10-CM

## 2024-03-28 NOTE — TELEPHONE ENCOUNTER
"Pt is transferred to this NT via call center. Pt states that she was reviewing her paperwork from her PCP visit on 3/25/24 and noticed that under diagnoses "Chronic Kidney Disease, Stage 3a" is listed as a dx. Pt states that no one spoke to her about this and that she was advised everything was okay with her kidneys. Pt is frustrated and states that she hasn't been able to get in touch with "anyone at [facility] in the last three days." NT advises pt that a high priority message will be routed to her PCP's office requesting a direct callback to pt during clinic hours with further clarification. Pt is upset, but verbalizes understanding.   Reason for Disposition   [1] Caller requesting NON-URGENT health information AND [2] PCP's office is the best resource    Additional Information   Negative: RN needs further essential information from caller in order to complete triage   Negative: Requesting regular office appointment    Protocols used: Information Only Call - No Triage-A-    "

## 2024-03-28 NOTE — TELEPHONE ENCOUNTER
----- Message from Tia Shah, Patient Care Assistant sent at 3/28/2024  3:21 PM CDT -----  Regarding: advice  Contact: pt  Type: Needs Medical     Who Called:  pt     Best Call Back Number: [phone] (home)     Additional Information: pt states she would like a callback regarding appointment with an vascular doctor and orders for PT. Please call to advise. Thanks!

## 2024-04-01 NOTE — TELEPHONE ENCOUNTER
BMP  Lab Results   Component Value Date     03/11/2024    K 4.2 03/11/2024     03/11/2024    CO2 24 03/11/2024    BUN 12 03/11/2024    CREATININE 1.2 03/11/2024    CALCIUM 9.9 03/11/2024    ANIONGAP 10 03/11/2024    EGFRNORACEVR 49.3 (A) 03/11/2024     Pt with reduced gfr (kidney function). Nml is >60; hers is 49.3 and 10 months ago 55.1. no concern at this time.     Please avoid NSAIDs (Motrin, Excedrin, Goody or BC headache powders, uncoated aspirin, Advil, ibuprofen, aleve, naproxen, etc) and ensure adequate hydration with water. We will repeat in 3 months.       Please schedule 3m bmp

## 2024-04-23 ENCOUNTER — PATIENT OUTREACH (OUTPATIENT)
Dept: ADMINISTRATIVE | Facility: HOSPITAL | Age: 69
End: 2024-04-23
Payer: MEDICARE

## 2024-04-23 NOTE — PROGRESS NOTES
Spoke with Pt who informs she has a lot going on lately adrianne Derm issues. Pt says she is not comfortable driving and will call back at a later date to schedule when a friend is available. Pt declines CHW informing. I'm okay I'd just rather a friend be with me.     VBHM Score: 4   LABS soon due  Cervical Cancer Screening  Colon Cancer Screening  Osteoporosis Screening  Mammogram    Influenza Vaccine  Tetanus Vaccine  Shingles/Zoster Vaccine  RSV Vaccine                  Health Maintenance Topic(s) Outreach Outcomes & Actions Taken:     Additional Notes:                 Care Management, Digital Medicine, and/or Education Referrals    OPCM Risk Score: 49.6                 Additional Notes:

## 2024-06-25 ENCOUNTER — PATIENT MESSAGE (OUTPATIENT)
Dept: FAMILY MEDICINE | Facility: CLINIC | Age: 69
End: 2024-06-25
Payer: MEDICARE

## 2024-07-31 DIAGNOSIS — Z78.0 MENOPAUSE: ICD-10-CM

## 2024-09-12 DIAGNOSIS — I71.40 ABDOMINAL AORTIC ANEURYSM (AAA) WITHOUT RUPTURE, UNSPECIFIED PART: Primary | ICD-10-CM

## 2024-09-18 DIAGNOSIS — R73.03 PREDIABETES: ICD-10-CM

## 2024-09-19 ENCOUNTER — TELEPHONE (OUTPATIENT)
Dept: VASCULAR SURGERY | Facility: CLINIC | Age: 69
End: 2024-09-19
Payer: MEDICARE

## 2024-12-10 ENCOUNTER — E-VISIT (OUTPATIENT)
Dept: FAMILY MEDICINE | Facility: CLINIC | Age: 69
End: 2024-12-10
Payer: MEDICARE

## 2024-12-10 DIAGNOSIS — F51.04 PSYCHOPHYSIOLOGICAL INSOMNIA: ICD-10-CM

## 2024-12-10 DIAGNOSIS — F41.9 ANXIETY: ICD-10-CM

## 2024-12-10 DIAGNOSIS — I20.9 ANGINA PECTORIS: Primary | ICD-10-CM

## 2024-12-11 PROBLEM — I20.9 ANGINA PECTORIS: Status: ACTIVE | Noted: 2024-12-11

## 2024-12-11 RX ORDER — DIAZEPAM 10 MG/1
10 TABLET ORAL 2 TIMES DAILY
Qty: 60 TABLET | Refills: 5 | Status: SHIPPED | OUTPATIENT
Start: 2024-12-11 | End: 2025-01-10

## 2024-12-11 NOTE — PROGRESS NOTES
Patient ID: Fauzia Cornelius is a 69 y.o. female.    Chief Complaint: General Illness (Entered automatically based on patient selection in [employer].)    The patient initiated a request through [employer] on 12/10/2024 for evaluation and management with a chief complaint of General Illness (Entered automatically based on patient selection in [employer].)     I evaluated the questionnaire submission on 12/11/2024  .    [facility] P[facility] Evisit [facility]-Chronic Conditions    12/10/2024  8:54 PM CST - Filed by Patient   What do you need help with? Medication Request   Do you agree to participate in an E-Visit? Yes   If you have any of the following symptoms, please present to your local emergency room or call 911:  I acknowledge   Medication requests for narcotics will not be addressed via an E-Visit.  Please schedule an appointment. I acknowledge   Do you want to address a new or existing medication? I would like to address a medication I currently take   What is the main issue you would like addressed today? Pending abdominal aneurysm surgery & extreme anxiety & insomnia   Would you like to change or continue your medication? Continue medication   What medication would you like to continue?  Diazepam   Are you taking it as prescribed? Yes    What medical condition is the  medication intended to treat? Anxiety, seizures   Is the medication helping your condition? Yes   Are you having any side effects from the medication? No   Provide any additional information you feel is important. Worsening Abdominal aneurysm is making mobility increasingly painful & difficult   Please attach any relevant images or files    Are you able to take your vital signs? No         Problem List Items Addressed This Visit          Psychiatric    Anxiety    Overview     Chronic history; doing well on valium bid (also takes for sz ppx)  Denies SI/HI; no hallucinations                 Cardiac/Vascular    Angina pectoris - Primary    Overview     Chronic  hx  Upcoming AAA surgical revision              Encounter Diagnoses   Name Primary?    Angina pectoris Yes    Anxiety         No orders of the defined types were placed in this encounter.             Follow up as needed       E-Visit Time Trackin min

## 2024-12-11 NOTE — TELEPHONE ENCOUNTER
Care Due:                  Date            Visit Type   Department     Provider  --------------------------------------------------------------------------------                                EP -                              PRIMARY      [facility] FAM[facility]  Last Visit: 03-      CARE ([facility])   MEDICINE       Vandana Arriaga  Next Visit: None Scheduled  None         None Found                                                            Last  Test          Frequency    Reason                     Performed    Due Date  --------------------------------------------------------------------------------    CBC.........  12 months..  meloxicam................  03- 03-    CMP.........  12 months..  meloxicam, rosuvastatin..  03- 03-    Lipid Panel.  12 months..  rosuvastatin.............  05- 05-    [employer] Embedded Care Due Messages. Reference number: [id number].   12/10/2024 9:01:12 PM CST

## 2024-12-11 NOTE — TELEPHONE ENCOUNTER
Refill Routing Note   Medication(s) are not appropriate for processing by [facility] for the following reason(s):        Outside of protocol    [facility] action(s):  Route   Requires labs : Yes             Appointments  past 12m or future 3m with PCP    Date Provider   Last Visit   3/25/2024 Vandana Arriaga MD   Next Visit   Visit date not found Vandana Arriaga MD   ED visits in past 90 days: 0        Note composed:7:22 AM 12/11/2024

## 2024-12-24 ENCOUNTER — TELEPHONE (OUTPATIENT)
Dept: FAMILY MEDICINE | Facility: CLINIC | Age: 69
End: 2024-12-24
Payer: MEDICARE

## 2024-12-24 NOTE — TELEPHONE ENCOUNTER
Spoke with pt, stated she went to ED last night for extreme abdominal pain and it ended up being a UTI, pt was given bactrim but no pain medicine. Pt requested pain medicine be sent in, advised PCP not in office and would need to be seen by a physician to further evaluate for pain medicine. Advised nothing available in office a this time and to proceed to nearest urgent care or ED if sx persist or worsen. Pt stated she is unsure if she will be able to go to ED and does not want to and is wanting medicine sent in, reiterated to pt she will need to be seen by a provider to further evaluate her and her pain. Stated she will see what she is able to do about going to ED or urgent care. ED precautions given. Pt thanked me and ended call.

## 2024-12-24 NOTE — TELEPHONE ENCOUNTER
----- Message from Cecilia sent at 12/24/2024  8:53 AM CST -----  Contact: Blaire/ daughter  Pts daughter is callling to speak with the nurse regarding questions and concerns. Reports was at the ER last night and wasn't  prescribed medication for  and needing to speak with primary care for pain medication. Please give pt daughter a call back at [phone]

## 2025-01-02 ENCOUNTER — PATIENT MESSAGE (OUTPATIENT)
Dept: FAMILY MEDICINE | Facility: CLINIC | Age: 70
End: 2025-01-02
Payer: MEDICARE

## 2025-01-02 DIAGNOSIS — L98.9 SKIN SORE: Primary | ICD-10-CM

## 2025-01-07 ENCOUNTER — TELEPHONE (OUTPATIENT)
Dept: FAMILY MEDICINE | Facility: CLINIC | Age: 70
End: 2025-01-07
Payer: MEDICARE

## 2025-01-07 ENCOUNTER — OFFICE VISIT (OUTPATIENT)
Dept: FAMILY MEDICINE | Facility: CLINIC | Age: 70
End: 2025-01-07
Payer: MEDICARE

## 2025-01-07 DIAGNOSIS — M54.50 DORSALGIA OF LUMBAR REGION: Primary | ICD-10-CM

## 2025-01-07 DIAGNOSIS — R56.9 SEIZURES: ICD-10-CM

## 2025-01-07 DIAGNOSIS — F33.2 SEVERE EPISODE OF RECURRENT MAJOR DEPRESSIVE DISORDER, WITHOUT PSYCHOTIC FEATURES: ICD-10-CM

## 2025-01-07 DIAGNOSIS — N18.31 CHRONIC KIDNEY DISEASE, STAGE 3A: ICD-10-CM

## 2025-01-07 PROBLEM — I71.43 INFRARENAL ABDOMINAL AORTIC ANEURYSM (AAA) WITHOUT RUPTURE: Status: ACTIVE | Noted: 2024-10-29

## 2025-01-07 PROBLEM — I71.20 THORACIC AORTIC ANEURYSM WITHOUT RUPTURE: Status: ACTIVE | Noted: 2024-11-04

## 2025-01-07 PROBLEM — I20.9 ANGINA PECTORIS: Status: ACTIVE | Noted: 2024-11-24

## 2025-01-07 PROBLEM — R03.0 ELEVATED BLOOD-PRESSURE READING WITHOUT DIAGNOSIS OF HYPERTENSION: Status: ACTIVE | Noted: 2024-11-24

## 2025-01-07 PROBLEM — L98.499 NON-PRESSURE CHRONIC ULCER OF SKIN OF OTHER SITES WITH UNSPECIFIED SEVERITY: Status: ACTIVE | Noted: 2025-01-07

## 2025-01-07 RX ORDER — SULFAMETHOXAZOLE AND TRIMETHOPRIM 800; 160 MG/1; MG/1
1 TABLET ORAL 2 TIMES DAILY
Qty: 20 TABLET | Refills: 0 | Status: SHIPPED | OUTPATIENT
Start: 2025-01-07 | End: 2025-01-17

## 2025-01-07 RX ORDER — OXYCODONE AND ACETAMINOPHEN 10; 325 MG/1; MG/1
1 TABLET ORAL EVERY 8 HOURS PRN
Qty: 21 TABLET | Refills: 0 | Status: SHIPPED | OUTPATIENT
Start: 2025-01-07 | End: 2025-01-14

## 2025-01-07 NOTE — PROGRESS NOTES
The patient location is: [place]  The chief complaint leading to consultation is: multiple concerns     Visit type: audio only  Time with patient: 10 minutes  15 minutes of total time spent on the encounter, which includes face to face time and non-face to face time preparing to see the patient (eg, review of tests), Obtaining and/or reviewing separately obtained history, Documenting clinical information in the electronic or other health record, Independently interpreting results (not separately reported) and communicating results to the patient/family/caregiver, or Care coordination (not separately reported).     Each patient to whom he or she provides medical services by telemedicine is:  (1) informed of the relationship between the physician and patient and the respective role of any other health care provider with respect to management of the patient; and (2) notified that he or she may decline to receive medical services by telemedicine and may withdraw from such care at any time.       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 years   She has not followed with neurology in years   - valium bid             Psychiatric    Severe episode of recurrent major depressive disorder, without psychotic features    Overview     Chronic hx; well controlled  Denies SI/HI; no hallucinations               Renal/    Chronic kidney disease, stage 3a    Overview     BMP  Lab Results   Component Value Date     03/11/2024    K 4.2 03/11/2024     03/11/2024    CO2 24 03/11/2024    BUN 12 03/11/2024    CREATININE 1.2 03/11/2024    CALCIUM 9.9 03/11/2024    ANIONGAP 10 03/11/2024    EGFRNORACEVR 49.3 (A) 03/11/2024     Chronic hx; stable           Other Visit Diagnoses       Dorsalgia of lumbar region    -  Primary    Relevant Medications    oxyCODONE-acetaminophen (PERCOCET)  mg per tablet    Other Relevant Orders    Urinalysis, Reflex to Urine  Culture Urine, Clean Catch            Assessment & Plan    EPISODE OF RECURRENT MAJOR DEPRESSIVE DISORDER, WITHOUT PSYCHOTIC FEATURES:  - Evaluated patient's condition, noting signs of severe depression including social isolation and emotional distress.  - Patient reports feeling sick for a long time, spending most of her time lying down due to pain.  - She expresses emotional distress about family relationships, particularly with her daughter and grandson.  - Acknowledged the patient's emotional state and family situation.  - Noted that the patient is taking diazepam to help with sleep.    SEIZURES:  - Noted the patient's history of seizure syndrome.    CHRONIC ULCER OF SKIN OF OTHER SITES WITH UNSPECIFIED SEVERITY:  - Evaluated recurring skin rash, noting bleeding and need for further investigation.  - Patient reports recurring skin sores that bleed.  - Suggested culturing the sores and requested an in-office visit.  - Prescribed Bactrim for the skin condition.    BACK PAIN:  - Evaluated chronic back pain and assessed effectiveness of current pain management regimen.  - Patient reports worsening pain from waist to left hip.  - Reviewed previous imaging studies (CT scans and CTs) which showed no abnormalities.  - Acknowledged patient's ongoing pain despite normal imaging results.  - Prescribed Percocet 10mg for pain management, weighing benefits against risks.  - Referred the patient for physical therapy.  - Inquired about the patient's ability to walk and balance.    FUSION HISTORY:  - Noted the patient's history of double spinal fusion performed 3-4 years ago, followed by physical therapy.    REPLACEMENT HISTORY:  - Noted the patient's history of left hip replacement performed in 2014, followed by physical therapy.  - This history is relevant to the current severe pain from waist to left hip.    CONSTIPATION:  - Acknowledged the patient's significant problems with constipation.    MANAGEMENT:  - Reviewed  medication options, considering the patient's multiple medication intolerances and allergies, including intolerance to morphine causing low blood pressure.  - Prescribed Percocet 10mg (1 week supply) as an alternative pain medication that the patient has tolerated before.  - Discussed safe use of opioids with benzodiazepines, instructing the patient to take Percocet at least 4-6 hours apart from diazepam (typically Percocet around 10 PM, diazepam early in the day).  - Educated patient on the risks of combining these medications, including potential for respiratory depression, coma, and death. Pt voiced understanding.      LABS:  - Ordered urine test.        Vandana Arriaga MD  _________________________________________________________________________      Patient ID: Fauzia Cornelius is a 69 y.o. female.    History of Present Illness    CHIEF COMPLAINT:  Patient presents today for back pain.    HISTORY OF PRESENT ILLNESS:  She reports acute back pain that started after moving a low couch. The pain extends from the waist to the left hip and is described as extremely painful, causing her to remain predominantly in a supine position. Treatment in the ER with a mild muscle relaxer and fentanyl was ineffective. She expresses interest in physical therapy.    MEDICAL HISTORY:  She had a spinal double fusion 3-4 years ago with subsequent physical therapy. She underwent left hip replacement in  with successful post-operative physical therapy.    MEDICATIONS:  She takes diazepam which helps with sleep. She previously tolerated Percocet.    MEDICATION INTOLERANCES:  She reports intolerance to tramadol causing GI issues. She has an intolerance to morphine which can cause significant hypotension when administered in excessive amounts.      SOCIAL HISTORY:  She lives in [place] and is estranged from her daughter and grandson who reside in [place]. Her son is .          Past medical histories reviewed, including past  medical, surgical, family and social histories.      Current Outpatient Medications on File Prior to Visit   Medication Sig Dispense Refill    brimonidine 0.15 % OPTH DROP (ALPHAGAN) 0.15 % ophthalmic solution Place 1 drop into both eyes 3 (three) times daily. 15 mL 1    diazePAM (VALIUM) 10 MG Tab Take 1 tablet (10 mg total) by mouth 2 (two) times a day. 60 tablet 5    dorzolamide-timolol 2-0.5% (COSOPT) 22.3-6.8 mg/mL ophthalmic solution Place 1 drop into both eyes 3 (three) times daily. 10 mL 3    hydrOXYzine HCL (ATARAX) 25 MG tablet Take 1 tablet (25 mg total) by mouth 3 (three) times daily as needed for Itching. 90 tablet 3    latanoprost 0.005 % ophthalmic solution put ONE drop IN EACH EYE EVERY DAY 2.5 mL 11    RHOPRESSA 0.02 % ophthalmic solution SMARTSI Drop(s) Right Eye Every Evening      rosuvastatin (CRESTOR) 10 MG tablet Take 1 tablet (10 mg total) by mouth every evening. 90 tablet 3    sulfamethoxazole-trimethoprim 800-160mg (BACTRIM DS) 800-160 mg Tab Take 1 tablet by mouth 2 (two) times daily. for 10 days 20 tablet 0     No current facility-administered medications on file prior to visit.       Review of Systems   12 point review of systems negative except for listed in HPI.     Objective:    Nursing note and vitals reviewed.  There were no vitals filed for this visit.  There is no height or weight on file to calculate BMI.     Physical Exam   No vitals or full physical exam obtained as this is a virtual visit  Gen: no distress, comfortable          We Offer Telehealth & Same Day Appointments!   Book your Telehealth appointment with me through my nurse or   Clinic appointments on [facility]!  [phone]-[phone]     To Schedule appointments online, go to [facility]: [email or web address]       Visit today included increased complexity associated with the care of the episodic problem addressed and managing the longitudinal care of the patient due to the serious and/or complex  managed problem(s) as per problem list.     This note was generated with the assistance of ambient listening technology. Verbal consent was obtained by the patient and accompanying visitor(s) for the recording of patient appointment to facilitate this note. I attest to having reviewed and edited the generated note for accuracy, though some syntax or spelling errors may persist. Please contact the author of this note for any clarification.

## 2025-01-07 NOTE — TELEPHONE ENCOUNTER
1st avail OV    Medications Ordered This Encounter   Medications    sulfamethoxazole-trimethoprim 800-160mg (BACTRIM DS) 800-160 mg Tab     Sig: Take 1 tablet by mouth 2 (two) times daily. for 10 days     Dispense:  20 tablet     Refill:  0

## 2025-01-07 NOTE — TELEPHONE ENCOUNTER
----- Message from Brook sent at 1/7/2025 11:03 AM CST -----  Regarding: call back  Type: Patient Call Back    Who called: pt     What is the request in detail: requesting call for appt, unable to use portal     Can the clinic reply by [facility]?no    Would the patient rather a call back or a response via [facility]? call    Best call back number: [phone]     Additional Information:

## 2025-01-08 ENCOUNTER — TELEPHONE (OUTPATIENT)
Dept: FAMILY MEDICINE | Facility: CLINIC | Age: 70
End: 2025-01-08
Payer: MEDICARE

## 2025-01-08 DIAGNOSIS — N18.31 CHRONIC KIDNEY DISEASE, STAGE 3A: ICD-10-CM

## 2025-01-08 NOTE — TELEPHONE ENCOUNTER
----- Message from Valarie sent at 1/8/2025  1:51 PM CST -----  Contact: Fauzia  Type:  Patient Requesting a call back     Who Called:Fauzia   What is the call back request regarding?:pt would like to let MD know that she has an appt at the [facility] in  with MD Andrews on the 13th. Appt is for an abdominal aneurism   Would the patient rather a call back or a response via [facility]?call   Best Call Back Number:[phone]    Additional Information:

## 2025-02-21 DIAGNOSIS — Z00.00 ENCOUNTER FOR MEDICARE ANNUAL WELLNESS EXAM: ICD-10-CM

## 2025-03-12 DIAGNOSIS — E78.49 OTHER HYPERLIPIDEMIA: ICD-10-CM

## 2025-03-12 RX ORDER — ROSUVASTATIN CALCIUM 10 MG/1
10 TABLET, COATED ORAL NIGHTLY
Qty: 90 TABLET | Refills: 3 | Status: SHIPPED | OUTPATIENT
Start: 2025-03-12

## 2025-03-12 NOTE — TELEPHONE ENCOUNTER
Refill Routing Note   Medication(s) are not appropriate for processing by [facility] for the following reason(s):        Required labs outdated    [facility] action(s):  Defer   Requires labs : Yes  - CMP and lipid            Appointments  past 12m or future 3m with PCP    Date Provider   Last Visit   1/7/2025 Vandana Arriaga MD   Next Visit   Visit date not found Vandana Arriaga MD   ED visits in past 90 days: 0        Note composed:3:22 PM 03/12/2025

## 2025-03-12 NOTE — TELEPHONE ENCOUNTER
Care Due:                  Date            Visit Type   Department     Provider  --------------------------------------------------------------------------------                                EP -                              PRIMARY      [facility]  Last Visit: 03-      CARE ([facility])   MEDICINE       Vandana Arriaga  Next Visit: None Scheduled  None         None Found                                                            Last  Test          Frequency    Reason                     Performed    Due Date  --------------------------------------------------------------------------------    CMP.........  12 months..  rosuvastatin.............  03- 03-    Lipid Panel.  12 months..  rosuvastatin.............  05- 05-    [facility] Embedded Care Due Messages. Reference number: [id number].   3/12/2025 8:37:41 AM CDT

## 2025-04-01 ENCOUNTER — TELEPHONE (OUTPATIENT)
Dept: FAMILY MEDICINE | Facility: CLINIC | Age: 70
End: 2025-04-01
Payer: MEDICARE

## 2025-04-01 NOTE — TELEPHONE ENCOUNTER
FW: Inpatient Discharge on 3/31/2025  Received: Today  Vandana Arriaga MD  P Royal Vandana Staff  Pt discharged from hospital on 3/31. Please ensure pt has hospital follow up scheduled with me on or before 4/7, if possible.  Thanks,            Previous Messages       ----- Message -----  From: Ulices Alan ([facility])  Sent: 3/31/2025   4:58 PM CDT  To: Vandana Arriaga MD ([facility])  Subject: Inpatient Discharge on 3/31/2025

## 2025-04-01 NOTE — TELEPHONE ENCOUNTER
I called and spoke with the patients daughter regarding this. Pt is currently admitted to skilled nursing. Pt will call to schedule follow up once discharged.

## 2025-04-14 ENCOUNTER — PATIENT OUTREACH (OUTPATIENT)
Dept: ADMINISTRATIVE | Facility: HOSPITAL | Age: 70
End: 2025-04-14
Payer: MEDICARE

## 2025-04-23 ENCOUNTER — OFFICE VISIT (OUTPATIENT)
Dept: FAMILY MEDICINE | Facility: CLINIC | Age: 70
End: 2025-04-23
Payer: MEDICARE

## 2025-04-23 VITALS
RESPIRATION RATE: 18 BRPM | OXYGEN SATURATION: 95 % | DIASTOLIC BLOOD PRESSURE: 65 MMHG | TEMPERATURE: 98 F | WEIGHT: 172 LBS | BODY MASS INDEX: 28.66 KG/M2 | HEIGHT: 65 IN | HEART RATE: 54 BPM | SYSTOLIC BLOOD PRESSURE: 116 MMHG

## 2025-04-23 DIAGNOSIS — I48.91 ATRIAL FIBRILLATION WITH RVR: ICD-10-CM

## 2025-04-23 DIAGNOSIS — I71.43 INFRARENAL ABDOMINAL AORTIC ANEURYSM (AAA) WITHOUT RUPTURE: ICD-10-CM

## 2025-04-23 DIAGNOSIS — M25.472 ANKLE EDEMA, BILATERAL: ICD-10-CM

## 2025-04-23 DIAGNOSIS — L29.9 ITCHING: ICD-10-CM

## 2025-04-23 DIAGNOSIS — F10.10 ALCOHOL ABUSE: ICD-10-CM

## 2025-04-23 DIAGNOSIS — Z12.31 ENCOUNTER FOR SCREENING MAMMOGRAM FOR MALIGNANT NEOPLASM OF BREAST: ICD-10-CM

## 2025-04-23 DIAGNOSIS — M25.471 ANKLE EDEMA, BILATERAL: ICD-10-CM

## 2025-04-23 DIAGNOSIS — E66.3 OVERWEIGHT (BMI 25.0-29.9): ICD-10-CM

## 2025-04-23 DIAGNOSIS — F32.9 REACTIVE DEPRESSION: Primary | ICD-10-CM

## 2025-04-23 DIAGNOSIS — F43.10 PTSD (POST-TRAUMATIC STRESS DISORDER): ICD-10-CM

## 2025-04-23 DIAGNOSIS — L98.421 SKIN ULCER OF SACRUM, LIMITED TO BREAKDOWN OF SKIN: ICD-10-CM

## 2025-04-23 PROCEDURE — 99999 PR PBB SHADOW E&M-EST. PATIENT-LVL V: CPT | Mod: PBBFAC,,, | Performed by: STUDENT IN AN ORGANIZED HEALTH CARE EDUCATION/TRAINING PROGRAM

## 2025-04-23 RX ORDER — VIT C/E/ZN/COPPR/LUTEIN/ZEAXAN 250MG-90MG
250 CAPSULE ORAL DAILY
COMMUNITY
Start: 2025-04-01

## 2025-04-23 RX ORDER — AMIODARONE HYDROCHLORIDE 200 MG/1
200 TABLET ORAL DAILY
COMMUNITY
Start: 2025-04-01

## 2025-04-23 RX ORDER — ESCITALOPRAM OXALATE 20 MG/1
20 TABLET ORAL DAILY
COMMUNITY
Start: 2025-04-01

## 2025-04-23 RX ORDER — PANTOPRAZOLE SODIUM 40 MG/1
40 TABLET, DELAYED RELEASE ORAL DAILY
COMMUNITY
Start: 2025-04-01

## 2025-04-23 RX ORDER — POTASSIUM CHLORIDE 20 MEQ/1
20 TABLET, EXTENDED RELEASE ORAL DAILY
COMMUNITY
Start: 2025-03-31

## 2025-04-23 RX ORDER — HYDROXYZINE HYDROCHLORIDE 25 MG/1
25 TABLET, FILM COATED ORAL 3 TIMES DAILY PRN
Qty: 90 TABLET | Refills: 3 | Status: SHIPPED | OUTPATIENT
Start: 2025-04-23

## 2025-04-23 RX ORDER — BRIMONIDINE TARTRATE 1.5 MG/ML
1 SOLUTION/ DROPS OPHTHALMIC 3 TIMES DAILY
Qty: 15 ML | Refills: 1 | Status: SHIPPED | OUTPATIENT
Start: 2025-04-23 | End: 2026-04-23

## 2025-04-23 RX ORDER — APIXABAN 5 MG/1
5 TABLET, FILM COATED ORAL 2 TIMES DAILY
COMMUNITY
Start: 2025-03-31

## 2025-04-23 RX ORDER — UREA 10 %
220 LOTION (ML) TOPICAL DAILY
COMMUNITY
Start: 2025-04-01 | End: 2025-04-23

## 2025-04-23 RX ORDER — HYDROCODONE BITARTRATE AND ACETAMINOPHEN 5; 325 MG/1; MG/1
1 TABLET ORAL EVERY 8 HOURS PRN
Qty: 90 TABLET | Refills: 0 | Status: SHIPPED | OUTPATIENT
Start: 2025-04-23

## 2025-04-23 RX ORDER — MULTIVITAMIN
1 TABLET ORAL DAILY
COMMUNITY
Start: 2025-04-01

## 2025-04-23 RX ORDER — METOPROLOL TARTRATE 25 MG/1
25 TABLET, FILM COATED ORAL 2 TIMES DAILY
COMMUNITY
Start: 2025-03-31

## 2025-04-23 RX ORDER — FOLIC ACID 1 MG/1
1 TABLET ORAL DAILY
COMMUNITY
Start: 2025-04-01

## 2025-04-23 RX ORDER — ASPIRIN 325 MG/1
100 TABLET, FILM COATED ORAL DAILY
COMMUNITY
Start: 2025-04-01

## 2025-04-23 RX ORDER — HYDROCODONE BITARTRATE AND ACETAMINOPHEN 5; 325 MG/1; MG/1
1 TABLET ORAL EVERY 8 HOURS PRN
COMMUNITY
Start: 2025-04-07 | End: 2025-04-23 | Stop reason: SDUPTHER

## 2025-04-23 RX ORDER — ERGOCALCIFEROL 1.25 MG/1
50000 CAPSULE ORAL
COMMUNITY
Start: 2025-04-01

## 2025-04-23 NOTE — PROGRESS NOTES
1. Reactive depression  -     Ambulatory referral/consult to Psychiatry; Future; Expected date: 04/30/2025    2. PTSD (post-traumatic stress disorder)  -     Ambulatory referral/consult to Psychiatry; Future; Expected date: 04/30/2025    3. Alcohol abuse  -     Ambulatory referral/consult to Psychiatry; Future; Expected date: 04/30/2025  -     B complex 11-folic-C-biot-zinc [id number] mg-mg-mcg-mg Tab; Take 1 tab daily  Dispense: 90 each; Refill: 3    4. Encounter for screening mammogram for malignant neoplasm of breast  -     Mammo Digital Screening Bilat w/ Charly (XPD); Future; Expected date: 04/23/2025    5. Atrial fibrillation with RVR  -     Ambulatory referral/consult to Cardiology; Future; Expected date: 04/30/2025    6. Skin ulcer of sacrum, limited to breakdown of skin  -     HYDROcodone-acetaminophen (NORCO) 5-325 mg per tablet; Take 1 tablet by mouth every 8 (eight) hours as needed for Pain.  Dispense: 90 tablet; Refill: 0    7. Itching  -     hydrOXYzine HCL (ATARAX) 25 MG tablet; Take 1 tablet (25 mg total) by mouth 3 (three) times daily as needed for Itching.  Dispense: 90 tablet; Refill: 3    8. Infrarenal abdominal aortic aneurysm (AAA) without rupture  Overview:  Chronic hx; on bb  US AAA 2022  Abdominal aortic aneurysm     CT ab/pelvis 2023 for hematochezia  Infrarenal abdominal aortic aneurysm measuring 4.5 x 4.4 cm in greatest axial dimension with moderate mural thrombus  - follow with vascular            9. Overweight (BMI 25.0-29.9)  Overview:  Wt Readings from Last 3 Encounters:   04/23/25 1345 78 kg (172 lb)   03/25/24 1405 79.7 kg (175 lb 12.8 oz)   03/11/24 1553 80.2 kg (176 lb 11.2 oz)           General weight loss/lifestyle modification strategies discussed: limit sugary drinks, exercise 3-5x per week  Informal exercise measures discussed              10. Ankle edema, bilateral  -     COMPRESSION STOCKINGS         Assessment & Plan    ALCOHOL ABUSE:  - Assessed recent hospitalization and  "rehabilitation stay due to alcohol abuse, depression, and fall resulting in prolonged immobility.    DEPRESSIVE DISORDER:  - Assessed need for social support and mental health follow-up given recent depressive episode.  - Recommend exploring social activities at the [facility] for improved mental health.  - Continued Lexapro for depression management.  - Referred to psychiatrist for ongoing depression management.  - Denies SI/HI; no hallucinations     PRESSURE ULCER:  - Evaluated wound care needs for sacral pressure injury.    ATRIAL FIBRILLATION:  - Considered cardiovascular status in light of recent a-fib diagnosis.  - Continued Eliquis and antiarrhythmic medication for AFib management.  - Referred to cardiologist for AFib follow-up and potential medication adjustments.    EDEMA:  - Discussed importance of elevating legs and using compression stockings to reduce swelling and manage edema.  - Ordered compression stockings for leg swelling.    PHYSICAL THERAPY:  - Emphasized importance of continuing physical therapy exercises at home between sessions.    ANXIETY DISORDER:  - Continued Atarax, recommended to take at bedtime instead of Valium when possible.    PREVENTIVE CARE:  - Schedule mammogram and bone scan.    MEDICATION MANAGEMENT:  - Reviewed medication regimen, including pain management and psychiatric medications.    FOLLOW-UP CARE:  - Follow up in 2 months.           Vandana Arriaga MD  _________________________________________________________________________      Patient ID: Fauzia Cornelius is a 70 y.o. female.    Chief Complaint: hospital follow up     Per chart review:  "Patient is a 69 yo F with a PMHx alcohol use disorder who was found down by her daughter. Found to have multifactorial shock due to sepsis from UTI, S viridans bacteremia, and hypovolemia lpotentially 2/2 heavy alcohol use. Patient was resuscitated with fluids and pressors and treated with abx fortunately improved significantly. " Also had new onset Afib RVR during her stay for which amiodarone was started. Patient also was PEC'd during admission due to being gravely disabled with depression and SI. Psychiatry was consulted and then rescinded the PEC due to improvement. She was also noted to have a sacral wound during admission which was debrided at bedside by general surgery. She completed a course of abx with IV CTX. Patient improved back to her baseline after the above treatment. She remained stable for discharge to nursing facility per her and her daughter's wishes as her daughter could not take her home with her and the patient could not care for herself any longer. She was discharged to [facility] in [place], [place] on 3/31/25.    Problem List    Shock, multifactorial   Septic shock due to UTI, S. Viridans bacteremia, shock resolved  Profound hypovolemia with dehydration/hyperosmolar hypernatremia   Acute biliary pancreatitis  Cholecystitis, ruled out  -Seen by ID. Patient with strep viridans bacteremia. Completed course of augmentin as rec'd by ID    Stage IV sacral wound  - Present on admission. Seen by general surgery. Debridement at bedside. Operative note reviewed. Continue frequent turning    Acute aspiration  -Continue aspiration precautions. Patient to sit upright for all oral intake. Monitor for any aspiration events    R axillary mass  - reports R axillary mass which has been present for at least a couple of years, has been told in the past she has a lipoma in this location   - US unremarkable   - f/u with gen surgery as outpatient     Hyperosmolar hypernatremia  Non-anion gap hyperchloremic metabolic acidosis   -Resolved. Monitor serum sodium    Acute kidney injury, resolved  - Hemodynamically mediated due to hypovolemia/hypotension   - Resolved    Nontraumatic rhabdomyolysis, resolved    New-onset atrial fibrillation with RVR, RVR resolved  Elevated troponin   -Continue Pacerone/amiodarone 200 mg daily. On eliquis 5 mg twice  "daily  -Should patient have continued falls, may need to discontinue Eliquis due to risk of bleeding secondary to falls    Acute metabolic encephalopathy/acute delirium with hallucinations  -Patient showing significant improvement over the last 48 hours and her mentation. Able to have significant conversation with patient currently    Hyperglycemia, resolved  - Weaned from insulin infusion   - likely due to D5W administration   - Resolved    Known descending thoracic aortic aneurysm  Abdominal aortic aneurysm, without rupture  Large mural thrombus:  - Per daughter, patient followed by vascular surgeon at [facility], has passed preop cardiac clearance, but surgeon uncomfortable proceeding with surgery due to patient's multiple chronic comorbid conditions, and continued chronic alcohol abuse. No evidence of rupture    Chronic alcoholism  Alcoholic ketoacidosis  - Continue thiamine and folic acid    Macrocytic anemia   - related to etoh abuse, stable  - cont folic acid   - b12 borderline, started replacement     History of depression, anxiety, PTSD, acute psychosis with delirium and hallucinations  - Patient on benzodiazepines at home  - UDS positive for benzodiazepines  - Psychiatry consulted: Not recommending geriatric psych placement. PEC/CEC rescinded  -Patient likely also with Warnicke's encephalopathy. Neurology follow-up outpatient"    See above for dc summary    HISTORY OF PRESENT ILLNESS:  She was hospitalized from March 5-31, 2024 after being found down at home for approximately 2-3 days. Hospital course was complicated by urinary tract infection, sacral wound, new onset atrial fibrillation, and acute kidney injury which subsequently resolved. Following discharge, she completed a 20-day rehabilitation stay at [facility] in [place], [place] for physical therapy.    CURRENT SYMPTOMS:  She reports constant sacral wound pain. She had one episode of severe abdominal pain during " rehabilitation.    DEPRESSION:  She has a history of depression with associated binge drinking occurring intermittently since her children were grown. Prior to recent hospitalization, she experienced severe depression leading to binge drinking vodka. She previously had a psychiatrist through [facility] who later transitioned to private practice but has since retired. She currently takes Lexapro and reports improvement in mood.    CURRENT MEDICATIONS:  She takes Valium twice daily as needed for stress and sleep, pain medication every 8 hours for wound management with supplemental ibuprofen for breakthrough pain, metoprolol and Eliquis for AFib management.    SOCIAL HISTORY:  She lives alone in a [place] apartment and reports feeling isolated. Her family history includes one daughter and a  son.          Past medical histories reviewed, including past medical, surgical, family and social histories.      Medications Ordered Prior to Encounter[1]    Review of Systems   12 point review of systems negative except for listed in HPI.     Objective:    Nursing note and vitals reviewed.  Vitals:    25 1345   BP: 116/65   Pulse: (!) 54   Resp: 18   Temp: 98.2 °F (36.8 °C)     Body mass index is 28.62 kg/m².     Physical Exam   Constitutional: oriented to person, place, and time. well-developed and well-nourished. No distress.   HENT: WNL  Head: Normocephalic and atraumatic.   Eyes: EOM are normal.   Neck: Normal range of motion. Neck supple.   Cardiovascular: slight bradycardia  Pulmonary/Chest: Effort normal. No respiratory distress.   GI: soft, non distended, no ttp, no rebound/guarding  Musculoskeletal: Normal range of motion. Kennedy le edema, walking with rollator  Neurological: CN II-XII intact  Skin: 10cm sacral wound with surrounding erythema and granulation tissue   Psychiatric: normal mood and affect. behavior is normal.           We Offer Telehealth & Same Day Appointments!   Book your Telehealth  appointment with me through my nurse or   Clinic appointments on [facility]!  [phone]     To Schedule appointments online, go to [email or web address]: [email or web address]     Transitional Care Note    Family and/or Caretaker present at visit?  Yes.  Diagnostic tests reviewed/disposition: I have reviewed all completed as well as pending diagnostic tests at the time of discharge.  Disease/illness education: sepsis, alcohol abuse, mdd                                       Home health/community services discussion/referrals: has [facility] for wound care at home  Establishment or re-establishment of referral orders for community resources: No other necessary community resources.   Discussion with other health care providers: No discussion with other health care providers necessary.          [1]   Current Outpatient Medications on File Prior to Visit   Medication Sig Dispense Refill    amiodarone (PACERONE) 200 MG Tab Take 200 mg by mouth once daily.      cyanocobalamin 500 MCG tablet Take 250 mcg by mouth once daily.      diazePAM (VALIUM) 10 MG Tab Take 1 tablet (10 mg total) by mouth 2 (two) times a day. 60 tablet 5    dorzolamide-timolol 2-0.5% (COSOPT) 22.3-6.8 mg/mL ophthalmic solution Place 1 drop into both eyes 3 (three) times daily. 10 mL 3    ELIQUIS 5 mg Tab Take 5 mg by mouth 2 (two) times daily.      ergocalciferol (ERGOCALCIFEROL) 50,000 unit Cap Take 50,000 Units by mouth every 7 days.      EScitalopram oxalate (LEXAPRO) 20 MG tablet Take 20 mg by mouth once daily.      folic acid (FOLVITE) 1 MG tablet Take 1 mg by mouth once daily.      latanoprost 0.005 % ophthalmic solution put ONE drop IN EACH EYE EVERY DAY 2.5 mL 11    metoprolol tartrate (LOPRESSOR) 25 MG tablet Take 25 mg by mouth 2 (two) times daily.      multivitamin with folic acid 400 mcg Tab Take 1 tablet by mouth once daily.      pantoprazole (PROTONIX) 40 MG tablet Take 40 mg by mouth once daily.      potassium chloride SA  (K-DUR,KLOR-CON) 20 MEQ tablet Take 20 mEq by mouth once daily.      RHOPRESSA 0.02 % ophthalmic solution SMARTSI Drop(s) Right Eye Every Evening      thiamine mononitrate, vit B1, (VITAMIN B-1, MONONITRATE,) 100 mg Tab Take 100 mg by mouth once daily.      rosuvastatin (CRESTOR) 10 MG tablet TAKE 1 TABLET BY MOUTH DAILY EVERY EVENING 90 tablet 3     No current facility-administered medications on file prior to visit.

## 2025-04-23 NOTE — PATIENT INSTRUCTIONS
[facility]  [address] [place] [address]  9am- 2pm daily  Serves breakfast and lunch      [facility]   [address] [place] [address]  8:30am- 12pm daily  Serves breakfast and lunch  Phone: [phone]

## 2025-04-24 ENCOUNTER — PATIENT OUTREACH (OUTPATIENT)
Dept: ADMINISTRATIVE | Facility: HOSPITAL | Age: 70
End: 2025-04-24
Payer: MEDICARE

## 2025-05-07 ENCOUNTER — TELEPHONE (OUTPATIENT)
Dept: FAMILY MEDICINE | Facility: CLINIC | Age: 70
End: 2025-05-07
Payer: MEDICARE

## 2025-05-07 NOTE — TELEPHONE ENCOUNTER
I call and spoke with Carly with [facility], Carly reports patient has several falls within the past 24 hours. Pt is going to the ED for evaluation and recommendations. PCP notified

## 2025-05-07 NOTE — TELEPHONE ENCOUNTER
----- Message from Jacqui sent at 5/7/2025  2:16 PM CDT -----  [facility]  Type:  Needs Medical AdviceWho Called: SusanSymptoms (please be specific): back pain, recent fall How long has patient had these symptoms:  Pharmacy name and phone #:  Would the patient rather a call back or a response via [facility]? Call back Best Call Back Number: [phone] Information: going to [facility]

## 2025-05-14 ENCOUNTER — TELEPHONE (OUTPATIENT)
Dept: FAMILY MEDICINE | Facility: CLINIC | Age: 70
End: 2025-05-14
Payer: MEDICARE

## 2025-05-14 NOTE — TELEPHONE ENCOUNTER
----- Message from Vandana Arriaga MD sent at 5/14/2025  1:13 PM CDT -----  1st avail vv  ----- Message -----  From: Patti Tran CMA  Sent: 5/14/2025  11:41 AM CDT  To: Vandana Arriaga MD    Carly stated that the patient has fallen again. Carly stated that she completed physical therapy. Carly stated lower back pain on both sides. Carly stated she does not know what to do. Carly did state she is seeing a spinal appointment next week. Carly stated that the patient is not experiencing no SOB, N/V, swelling or diarrhea.  ----- Message -----  From: Valarie Carlos  Sent: 5/14/2025  11:31 AM CDT  To: [facility] Staff    Type:  Patient Requesting a call back [facility]?:caller states that pt keeps falling and is unsure of what to do, pt went to the hospital and was given IV fluids and was sent home/ pt had a fall again on yesterday 5/13/25 Would the patient rather a call back or a response via [facility]?callBe Call Back Number:[phone] Information:

## 2025-05-14 NOTE — TELEPHONE ENCOUNTER
Spoke with the patient about scheduling a VV with Dr Arriaga, patient appointment is scheduled for 05/19/25.

## 2025-05-19 ENCOUNTER — TELEPHONE (OUTPATIENT)
Dept: CARDIOLOGY | Facility: CLINIC | Age: 70
End: 2025-05-19
Payer: MEDICARE

## 2025-05-19 ENCOUNTER — OFFICE VISIT (OUTPATIENT)
Dept: FAMILY MEDICINE | Facility: CLINIC | Age: 70
End: 2025-05-19
Payer: MEDICARE

## 2025-05-19 DIAGNOSIS — F43.10 PTSD (POST-TRAUMATIC STRESS DISORDER): ICD-10-CM

## 2025-05-19 DIAGNOSIS — R19.7 DIARRHEA, UNSPECIFIED TYPE: ICD-10-CM

## 2025-05-19 DIAGNOSIS — R29.6 RECURRENT FALLS: ICD-10-CM

## 2025-05-19 DIAGNOSIS — Z76.89 ENCOUNTER TO ESTABLISH CARE: Primary | ICD-10-CM

## 2025-05-19 DIAGNOSIS — I48.91 ATRIAL FIBRILLATION WITH RVR: ICD-10-CM

## 2025-05-19 DIAGNOSIS — E66.3 OVERWEIGHT (BMI 25.0-29.9): Primary | ICD-10-CM

## 2025-05-19 PROBLEM — L29.9 PRURITUS: Status: ACTIVE | Noted: 2023-07-26

## 2025-05-19 PROCEDURE — 1159F MED LIST DOCD IN RCRD: CPT | Mod: CPTII,95,, | Performed by: STUDENT IN AN ORGANIZED HEALTH CARE EDUCATION/TRAINING PROGRAM

## 2025-05-19 PROCEDURE — 98006 SYNCH AUDIO-VIDEO EST MOD 30: CPT | Mod: 95,,, | Performed by: STUDENT IN AN ORGANIZED HEALTH CARE EDUCATION/TRAINING PROGRAM

## 2025-05-19 PROCEDURE — 3044F HG A1C LEVEL LT 7.0%: CPT | Mod: CPTII,95,, | Performed by: STUDENT IN AN ORGANIZED HEALTH CARE EDUCATION/TRAINING PROGRAM

## 2025-05-19 PROCEDURE — G2211 COMPLEX E/M VISIT ADD ON: HCPCS | Mod: 95,,, | Performed by: STUDENT IN AN ORGANIZED HEALTH CARE EDUCATION/TRAINING PROGRAM

## 2025-05-19 PROCEDURE — 1160F RVW MEDS BY RX/DR IN RCRD: CPT | Mod: CPTII,95,, | Performed by: STUDENT IN AN ORGANIZED HEALTH CARE EDUCATION/TRAINING PROGRAM

## 2025-05-19 RX ORDER — DIAZEPAM 5 MG/1
5 TABLET ORAL 2 TIMES DAILY
Qty: 60 TABLET | Refills: 5 | Status: SHIPPED | OUTPATIENT
Start: 2025-05-19 | End: 2025-06-18

## 2025-05-19 RX ORDER — METOPROLOL TARTRATE 25 MG/1
12.5 TABLET, FILM COATED ORAL 2 TIMES DAILY
Qty: 90 TABLET | Refills: 3 | Status: SHIPPED | OUTPATIENT
Start: 2025-05-19 | End: 2026-05-19

## 2025-05-19 NOTE — TELEPHONE ENCOUNTER
----- Message from Deedee sent at 5/19/2025  9:25 AM CDT -----  Type: Needs Medical AdviceWho Called:  pt nurse Best Call Back Number: [facility] [phone] Information: pt requesting call back in regards to  pt heart rate being 48 before any medication being taken please advise

## 2025-05-19 NOTE — PROGRESS NOTES
The patient location is: [place]  The chief complaint leading to consultation is: med review     Visit type: audiovisual    Time with patient: 10 minutes  15 minutes of total time spent on the encounter, which includes face to face time and non-face to face time preparing to see the patient (eg, review of tests), Obtaining and/or reviewing separately obtained history, Documenting clinical information in the electronic or other health record, Independently interpreting results (not separately reported) and communicating results to the patient/family/caregiver, or Care coordination (not separately reported).     Each patient to whom he or she provides medical services by telemedicine is:  (1) informed of the relationship between the physician and patient and the respective role of any other health care provider with respect to management of the patient; and (2) notified that he or she may decline to receive medical services by telemedicine and may withdraw from such care at any time.       1. Overweight (BMI 25.0-29.9)  Overview:  Wt Readings from Last 3 Encounters:   05/07/25 1519 77.1 kg (170 lb)   04/23/25 1345 78 kg (172 lb)   03/25/24 1405 79.7 kg (175 lb 12.8 oz)           General weight loss/lifestyle modification strategies discussed: limit sugary drinks, exercise 3-5x per week  Informal exercise measures discussed              2. Atrial fibrillation with RVR  -     metoprolol tartrate (LOPRESSOR) 25 MG tablet; Take 0.5 tablets (12.5 mg total) by mouth 2 (two) times daily.  Dispense: 90 tablet; Refill: 3    3. Diarrhea, unspecified type  -     X-Ray Abdomen Flat And Erect; Future; Expected date: 05/19/2025    4. PTSD (post-traumatic stress disorder)  Overview:  Chronic hx; prn valium helps     Orders:  -     diazePAM (VALIUM) 5 MG tablet; Take 1 tablet (5 mg total) by mouth 2 (two) times a day.  Dispense: 60 tablet; Refill: 5    5. Recurrent falls  -     Ambulatory referral/consult to Home Health; Future; Expected  date: 05/20/2025         Assessment & Plan    UNSTEADINESS ON FEET AND FALLS:  - Assessed patient's reports of frequent falls, including a recent fall yesterday, and unsteadiness on feet.  - Variable blood pressure readings were observed which may contribute to these issues.  - Evaluated medication list for fall risk factors, including Valium and Eliquis.  - Recognized potential bleeding risk due to Eliquis use in conjunction with frequent falls.  - Referred for home health physical therapy to address unsteadiness and prevent future falls.    ATRIAL FIBRILLATION:  - Continued amiodarone 200 mg daily for atrial fibrillation management.    BRADYCARDIA:  - Noted the patient's heart rate is low, likely due to current medication regimen including metoprolol and amiodarone for atrial fibrillation.  - Decreased metoprolol to 12.5 mg twice daily (half tablet in the morning, half tablet in the evening).  - Instructed the patient not to take metoprolol if heart rate is less than 60 bpm.  - routine cards follow up    PRESSURE ULCER:  - Noted the patient has a wound on the buttocks requiring antibiotics.  - Patient is receiving wound care from [facility], who visits twice weekly.  - Planned to discuss antibiotic prescription (possibly levofloxacin) with wound care specialist [facility] at next appointment.  - Instructed the patient to contact office if any new prescriptions are needed after discussion with wound care specialist.    FUNCTIONAL DIARRHEA:  - Noted the patient reports frequent diarrhea, some loose and some not.  - Patient has not been eating much, which may contribute to symptoms.  - Considered potential constipation as cause of reported diarrhea, possibly overflow diarrhea.  - Ordered XR Abdomen to assess for constipation after appointment with Dr. Adams.    ANXIETY DISORDER:  - Noted the patient reports anxiety and is taking Valium twice daily for management.  - Discussed reducing Valium dosage due to fall risk.  -  Decreased Valium dose by half.    HISTORY OF ALCOHOL ABUSE:  - Noted the patient reports perfect sobriety and no desire to drink.  - Patient is happy about maintaining sobriety.    LONG TERM USE OF ANTICOAGULANTS:  - Advised the patient to discuss the use of Eliquis with Dr. Adams due to bleeding risk associated with frequent falls.    FOLLOW-UP:  - Advised to follow up with Dr. Adams (cardiologist) at scheduled appointment tomorrow.             Vandana Arriaga MD  _________________________________________________________________________      Patient ID: Fauzia Cornelius is a 70 y.o. female.    History of Present Illness    CHIEF COMPLAINT:  Patient presents today for frequent falls    FALLS AND SAFETY:  She reports recurrent falls with most recent episode occurring yesterday due to legs giving out. A friend is temporarily staying with her for assistance due to her regular caregiver's unavailability.    MEDICAL HISTORY:  She has a history of seizure syndrome since birth and atrial fibrillation. She receives wound care services on Tuesdays and Fridays.    GASTROINTESTINAL:  She reports frequent diarrhea without normal solid bowel movements for an extended period. She notes poor appetite with decreased oral intake but denies weight loss.    SOCIAL HISTORY:  She maintains sobriety and denies any desire to consume alcohol.          Past medical histories reviewed, including past medical, surgical, family and social histories.      Medications Ordered Prior to Encounter[1]    Review of Systems   12 point review of systems negative except for listed in HPI.     Objective:    Nursing note and vitals reviewed.  There were no vitals filed for this visit.  There is no height or weight on file to calculate BMI.     Physical Exam   No vitals or full physical exam obtained as this is a virtual visit  Gen: no distress, comfortable          We Offer Telehealth & Same Day Appointments!   Book your Telehealth appointment with me  through my nurse or   Clinic appointments on [facility]!  [phone]     To Schedule appointments online, go to [facility]: [email or web address]       Visit today included increased complexity associated with the care of the episodic problem addressed and managing the longitudinal care of the patient due to the serious and/or complex managed problem(s) as per problem list.     This note was generated with the assistance of ambient listening technology. Verbal consent was obtained by the patient and accompanying visitor(s) for the recording of patient appointment to facilitate this note. I attest to having reviewed and edited the generated note for accuracy, though some syntax or spelling errors may persist. Please contact the author of this note for any clarification.             [1]   Current Outpatient Medications on File Prior to Visit   Medication Sig Dispense Refill    amiodarone (PACERONE) 200 MG Tab Take 200 mg by mouth once daily.      B complex 11-folic-C-biot-zinc [id number] mg-mg-mcg-mg Tab Take 1 tab daily 90 each 3    brimonidine 0.15 % OPTH DROP (ALPHAGAN) 0.15 % ophthalmic solution Place 1 drop into both eyes 3 (three) times daily. 15 mL 1    cyanocobalamin 500 MCG tablet Take 250 mcg by mouth once daily.      dorzolamide-timolol 2-0.5% (COSOPT) 22.3-6.8 mg/mL ophthalmic solution Place 1 drop into both eyes 3 (three) times daily. 10 mL 3    ELIQUIS 5 mg Tab Take 5 mg by mouth 2 (two) times daily.      ergocalciferol (ERGOCALCIFEROL) 50,000 unit Cap Take 50,000 Units by mouth every 7 days.      EScitalopram oxalate (LEXAPRO) 20 MG tablet Take 20 mg by mouth once daily.      folic acid (FOLVITE) 1 MG tablet Take 1 mg by mouth once daily.      HYDROcodone-acetaminophen (NORCO) 5-325 mg per tablet Take 1 tablet by mouth every 8 (eight) hours as needed for Pain. 90 tablet 0    hydrOXYzine HCL (ATARAX) 25 MG tablet Take 1 tablet (25 mg total) by mouth 3 (three) times daily as  needed for Itching. 90 tablet 3    latanoprost 0.005 % ophthalmic solution put ONE drop IN EACH EYE EVERY DAY 2.5 mL 11    multivitamin with folic acid 400 mcg Tab Take 1 tablet by mouth once daily.      pantoprazole (PROTONIX) 40 MG tablet Take 40 mg by mouth once daily.      potassium chloride SA (K-DUR,KLOR-CON) 20 MEQ tablet Take 20 mEq by mouth once daily.      RHOPRESSA 0.02 % ophthalmic solution SMARTSI Drop(s) Right Eye Every Evening      rosuvastatin (CRESTOR) 10 MG tablet TAKE 1 TABLET BY MOUTH DAILY EVERY EVENING 90 tablet 3    thiamine mononitrate, vit B1, (VITAMIN B-1, MONONITRATE,) 100 mg Tab Take 100 mg by mouth once daily.       No current facility-administered medications on file prior to visit.

## 2025-05-19 NOTE — TELEPHONE ENCOUNTER
nurse advised to fax patient's vital sign log. Patient has never seen Dr. Adams, however she has a visit tomorrow to discuss medication and HR.

## 2025-05-19 NOTE — PATIENT INSTRUCTIONS
Seng Cage,     If you are due for any health screening(s) below please notify me so we can arrange them to be ordered and scheduled. Most healthy patients at your age complete them, but you are free to accept or refuse.     If you can't do it, I'll definitely understand. If you can, I'd certainly appreciate it!    Tests to Keep You Healthy    Mammogram: ORDERED BUT NOT SCHEDULED  Colon Cancer Screening: ORDERED      Schedule your breast cancer screening today     Breast cancer is the second most common cancer in women,  and the second leading cause of death from cancer. Mammograms can detect breast cancer early, which significantly increases the chances of curing the cancer.       Our records indicate that you may be overdue for breast cancer screening. Cancer screenings save lives, so schedule yours today to stay healthy.     If you recently had a mammogram performed outside of [facility], please let your Health care team know so that they can update your health record.        Its time for your colon cancer screening     Colorectal cancer is one of the leading causes of cancer death for men and women but it doesnt have to be. Screenings can prevent colorectal cancer or find it early enough to treat and cure the disease.     Our records indicate that you may be overdue for colon cancer screening. A colonoscopy or stool screening test can help identify patients at risk for developing colon cancer. Cancer screenings save lives, so schedule yours today to stay healthy.     A colonoscopy is the preferred test for detecting colon cancer. It is needed only once every 10 years if results are negative. While you are sedated, a flexible, lighted tube with a tiny camera is inserted into the rectum and advanced through the colon to look for cancers.     An alternative screening test that is used at home and returned to the lab may also be used. It detects hidden blood in bowel movements which could indicate cancer  in the colon. If results are positive, you will need a colonoscopy to determine if the blood is a sign of cancer. This type of follow up (diagnostic) colonoscopy usually requires additional copays as required by your insurance provider.     If you recently had your colon cancer screening performed outside of [facility], please let your Health care team know so that they can update your health record. Please contact your PCP if you have any questions.    Were here to help you quit smoking     Our records indicated that you are still smoking. One of the best things you can do for your health is to stop smoking and we are here to help.     Talk with your provider about our Smoking Cessation Program and how we can support you on your journey.

## 2025-05-28 ENCOUNTER — TELEPHONE (OUTPATIENT)
Dept: FAMILY MEDICINE | Facility: CLINIC | Age: 70
End: 2025-05-28
Payer: MEDICARE

## 2025-06-02 ENCOUNTER — TELEPHONE (OUTPATIENT)
Dept: FAMILY MEDICINE | Facility: CLINIC | Age: 70
End: 2025-06-02
Payer: MEDICARE

## 2025-06-13 ENCOUNTER — TELEPHONE (OUTPATIENT)
Dept: FAMILY MEDICINE | Facility: CLINIC | Age: 70
End: 2025-06-13
Payer: MEDICARE

## 2025-06-13 DIAGNOSIS — M54.50 CHRONIC BILATERAL LOW BACK PAIN WITHOUT SCIATICA: Primary | ICD-10-CM

## 2025-06-13 DIAGNOSIS — G89.29 CHRONIC BILATERAL LOW BACK PAIN WITHOUT SCIATICA: Primary | ICD-10-CM

## 2025-06-13 DIAGNOSIS — L98.9 SKIN SORE: ICD-10-CM

## 2025-06-13 NOTE — TELEPHONE ENCOUNTER
Copied from CRM #[id number]. Topic: General Inquiry - Patient Advice  >> Jun 13, 2025 12:23 PM Mary wrote:  Type:  Needs Medical Advice    Who Called: daughter  Symptoms (please be specific): na   How long has patient had these symptoms:  eliza  Pharmacy name and phone #:    [facility] [place] - [address] [address] [facility]  [address] [address]  Phone: [phone] Fax: [phone]    Would the patient rather a call back or a response via [facility]? call  Best Call Back Number: [phone]  Additional Information: was released from facility for psychiatric care but orders were not placed to continue wound care, pt, and ot with home health. Home health advised to reach out to pcp.

## 2025-06-13 NOTE — TELEPHONE ENCOUNTER
Orders Placed This Encounter   Procedures    SUBSEQUENT HOME HEALTH ORDERS     Resume wound care, OT, PT     What [facility] Health Agency is the patient [facility]?:   [facility]

## 2025-06-16 ENCOUNTER — PATIENT OUTREACH (OUTPATIENT)
Dept: ADMINISTRATIVE | Facility: HOSPITAL | Age: 70
End: 2025-06-16
Payer: MEDICARE

## 2025-06-16 NOTE — PROGRESS NOTES
[facility] Report: VB program activated, Spoke with Pt about overdue HM topic below, Pt informs she has too much medical things going on at this time, pt uninterested in scheduling any than other than Mammogram at this time. Pt agrees to have Mammogram after he visit with PCP on July 11th.      [facility] Score: 4      Cervical Cancer Screening  Colon Cancer Screening  Osteoporosis Screening  Mammogram     Tetanus Vaccine  Shingles/Zoster Vaccine  RSV Vaccine

## 2025-07-02 ENCOUNTER — OFFICE VISIT (OUTPATIENT)
Dept: CARDIOLOGY | Facility: CLINIC | Age: 70
End: 2025-07-02
Payer: MEDICARE

## 2025-07-02 ENCOUNTER — HOSPITAL ENCOUNTER (OUTPATIENT)
Dept: CARDIOLOGY | Facility: HOSPITAL | Age: 70
Discharge: HOME | End: 2025-07-02
Attending: INTERNAL MEDICINE
Payer: MEDICARE

## 2025-07-02 VITALS
OXYGEN SATURATION: 98 % | HEART RATE: 64 BPM | SYSTOLIC BLOOD PRESSURE: 140 MMHG | WEIGHT: 159.13 LBS | DIASTOLIC BLOOD PRESSURE: 88 MMHG | BODY MASS INDEX: 26.51 KG/M2 | HEIGHT: 65 IN

## 2025-07-02 DIAGNOSIS — I48.91 ATRIAL FIBRILLATION WITH RVR: ICD-10-CM

## 2025-07-02 DIAGNOSIS — I71.43 INFRARENAL ABDOMINAL AORTIC ANEURYSM (AAA) WITHOUT RUPTURE: Primary | ICD-10-CM

## 2025-07-02 DIAGNOSIS — F10.10 ALCOHOL ABUSE: ICD-10-CM

## 2025-07-02 DIAGNOSIS — I48.0 PAROXYSMAL ATRIAL FIBRILLATION: ICD-10-CM

## 2025-07-02 DIAGNOSIS — F32.9 REACTIVE DEPRESSION: ICD-10-CM

## 2025-07-02 PROBLEM — I20.9 ANGINA PECTORIS: Status: RESOLVED | Noted: 2024-11-24 | Resolved: 2025-07-02

## 2025-07-02 PROCEDURE — 3077F SYST BP >= 140 MM HG: CPT | Mod: CPTII,S$GLB,, | Performed by: INTERNAL MEDICINE

## 2025-07-02 PROCEDURE — 99204 OFFICE O/P NEW MOD 45 MIN: CPT | Mod: S$GLB,,, | Performed by: INTERNAL MEDICINE

## 2025-07-02 PROCEDURE — 1125F AMNT PAIN NOTED PAIN PRSNT: CPT | Mod: CPTII,S$GLB,, | Performed by: INTERNAL MEDICINE

## 2025-07-02 PROCEDURE — 93005 ELECTROCARDIOGRAM TRACING: CPT | Mod: PO

## 2025-07-02 PROCEDURE — 3288F FALL RISK ASSESSMENT DOCD: CPT | Mod: CPTII,S$GLB,, | Performed by: INTERNAL MEDICINE

## 2025-07-02 PROCEDURE — 3044F HG A1C LEVEL LT 7.0%: CPT | Mod: CPTII,S$GLB,, | Performed by: INTERNAL MEDICINE

## 2025-07-02 PROCEDURE — 1159F MED LIST DOCD IN RCRD: CPT | Mod: CPTII,S$GLB,, | Performed by: INTERNAL MEDICINE

## 2025-07-02 PROCEDURE — 1160F RVW MEDS BY RX/DR IN RCRD: CPT | Mod: CPTII,S$GLB,, | Performed by: INTERNAL MEDICINE

## 2025-07-02 PROCEDURE — 3008F BODY MASS INDEX DOCD: CPT | Mod: CPTII,S$GLB,, | Performed by: INTERNAL MEDICINE

## 2025-07-02 PROCEDURE — 3079F DIAST BP 80-89 MM HG: CPT | Mod: CPTII,S$GLB,, | Performed by: INTERNAL MEDICINE

## 2025-07-02 PROCEDURE — 99999 PR PBB SHADOW E&M-EST. PATIENT-LVL IV: CPT | Mod: PBBFAC,,, | Performed by: INTERNAL MEDICINE

## 2025-07-02 PROCEDURE — 1100F PTFALLS ASSESS-DOCD GE2>/YR: CPT | Mod: CPTII,S$GLB,, | Performed by: INTERNAL MEDICINE

## 2025-07-02 RX ORDER — MULTIVITAMIN
1 TABLET ORAL
COMMUNITY
Start: 2025-06-12

## 2025-07-02 RX ORDER — QUETIAPINE FUMARATE 50 MG/1
50 TABLET, FILM COATED ORAL NIGHTLY
COMMUNITY
Start: 2025-06-12

## 2025-07-02 RX ORDER — LEVOTHYROXINE SODIUM 25 UG/1
25 TABLET ORAL
COMMUNITY
Start: 2025-06-12

## 2025-07-02 NOTE — PROGRESS NOTES
Subjective   Patient ID:  Fauzia Cornelius is a 70 y.o. female who presents for evaluation of No chief complaint on file.      [id number] who was in [place] in March with multiple medical problems and according to her had AF. Don-t have documentation of that available. She did not follow up at [place] but saw Dr Plunkett and according to patient had multiple test on her heart and was told everything was normal but for whatever reason doesn't want to follow up there. She now presents here with multiple questions but without the records cannot give her definitive answer. EKG sinus bradycardia    Review of Systems   Constitutional: Negative for decreased appetite, fever, malaise/fatigue, weight gain and weight loss.   HENT:  Negative for hearing loss and nosebleeds.    Eyes:  Negative for visual disturbance.   Cardiovascular:  Negative for chest pain, claudication, cyanosis, dyspnea on exertion, irregular heartbeat, leg swelling, near-syncope, orthopnea, palpitations, paroxysmal nocturnal dyspnea and syncope.   Respiratory:  Negative for cough, hemoptysis, shortness of breath, sleep disturbances due to breathing, snoring and wheezing.    Endocrine: Negative for cold intolerance, heat intolerance, polydipsia and polyuria.   Hematologic/Lymphatic: Negative for adenopathy and bleeding problem. Does not bruise/bleed easily.   Skin:  Negative for color change, itching, poor wound healing, rash and suspicious lesions.   Musculoskeletal:  Positive for falls. Negative for arthritis, back pain, joint pain, joint swelling, muscle cramps, muscle weakness and myalgias.   Gastrointestinal:  Negative for bloating, abdominal pain, change in bowel habit, constipation, flatus, heartburn, hematemesis, hematochezia, hemorrhoids, jaundice, melena, nausea and vomiting.   Genitourinary:  Negative for bladder incontinence, decreased libido, frequency, hematuria, hesitancy and urgency.   Neurological:  Negative for brief paralysis,  "difficulty with concentration, excessive daytime sleepiness, dizziness, focal weakness, headaches, light-headedness, loss of balance, numbness, vertigo and weakness.   Psychiatric/Behavioral:  Negative for altered mental status, depression and memory loss. The patient does not have insomnia and is not nervous/anxious.    Allergic/Immunologic: Negative for environmental allergies, hives and persistent infections.          Objective     Physical Exam  Vitals and nursing note reviewed.   Constitutional:       Appearance: She is well-developed.      Comments: BP (!) 140/88   Pulse 64   Ht 5' 5" (1.651 m)   Wt 72.2 kg (159 lb 1.6 oz)   SpO2 98%   BMI 26.48 kg/m²      HENT:      Head: Normocephalic and atraumatic.      Right Ear: External ear normal.      Left Ear: External ear normal.      Nose: Nose normal.   Eyes:      General: Lids are normal. No scleral icterus.        Right eye: No discharge.         Left eye: No discharge.      Conjunctiva/sclera: Conjunctivae normal.      Right eye: No hemorrhage.     Pupils: Pupils are equal, round, and reactive to light.   Neck:      Thyroid: No thyromegaly.      Vascular: No JVD.      Trachea: No tracheal deviation.   Cardiovascular:      Rate and Rhythm: Normal rate and regular rhythm.      Pulses: Intact distal pulses.      Heart sounds: Normal heart sounds. No murmur heard.     No friction rub. No gallop.   Pulmonary:      Effort: Pulmonary effort is normal. No respiratory distress.      Breath sounds: Normal breath sounds. No wheezing or rales.   Chest:      Chest wall: No tenderness.   Breasts:     Breasts are symmetrical.   Abdominal:      General: Bowel sounds are normal. There is no distension.      Palpations: Abdomen is soft. There is no hepatomegaly or mass.      Tenderness: There is no abdominal tenderness. There is no guarding or rebound.   Musculoskeletal:         General: No tenderness. Normal range of motion.      Cervical back: Normal range of motion and " neck supple.   Lymphadenopathy:      Cervical: No cervical adenopathy.   Skin:     General: Skin is warm and dry.      Coloration: Skin is not pale.      Findings: No erythema or rash.   Neurological:      Mental Status: She is alert and oriented to person, place, and time.      Cranial Nerves: No cranial nerve deficit.      Coordination: Coordination normal.      Deep Tendon Reflexes: Reflexes are normal and symmetric. Reflexes normal.   Psychiatric:         Behavior: Behavior normal.         Thought Content: Thought content normal.         Judgment: Judgment normal.            Assessment and Plan     1. Infrarenal abdominal aortic aneurysm (AAA) without rupture    2. Atrial fibrillation with RVR    3. Alcohol abuse stop alcohol   4. Reactive depression    5. Paroxysmal atrial fibrillation currently in sinus       Plan:  Stop amiodarone   14 day monitor  If no AF and it happened in the setting of a prolonged illness may stop eliquis.      Orders Placed This Encounter   Procedures    Cardiac Monitor - 3-15 Day Adult (Cupid Only)    IN OFFICE EKG 12-LEAD (to Muse)     Follow up in about 6 months (around 1/2/2026).     Advance Care Planning     Date: 07/02/2025

## 2025-07-03 LAB
OHS QRS DURATION: 88 MS
OHS QTC CALCULATION: 422 MS

## 2025-07-08 ENCOUNTER — TELEPHONE (OUTPATIENT)
Dept: CARDIOLOGY | Facility: CLINIC | Age: 70
End: 2025-07-08
Payer: MEDICARE

## 2025-07-08 ENCOUNTER — TELEPHONE (OUTPATIENT)
Dept: CARDIOLOGY | Facility: HOSPITAL | Age: 70
End: 2025-07-08
Payer: MEDICARE

## 2025-07-08 NOTE — TELEPHONE ENCOUNTER
Copied from CRM #[id number]. Topic: Appointments - Appointment Access  >> Jul 8, 2025  1:44 PM Sindhu wrote:  ..Type:  Patient Requesting Call    Who Called: pt   Does the patient know what this is regarding?: r/s 7/9 holter appt   Would the patient rather a call back or a response via [facility]? Call   Best Call Back Number: .[phone] (home)  Additional Information:        Returned call to r/s holter. No ans. LVM for pt to call back

## 2025-07-09 ENCOUNTER — TELEPHONE (OUTPATIENT)
Dept: CARDIOLOGY | Facility: CLINIC | Age: 70
End: 2025-07-09
Payer: MEDICARE

## 2025-07-09 NOTE — TELEPHONE ENCOUNTER
Followed up with Fauzia, patient has been notified of this information and all questions answered. Per patient's provider:  Have her stop the metoprolol. Patient verbalized understanding.

## 2025-07-09 NOTE — TELEPHONE ENCOUNTER
Copied from CRM #[id number]. Topic: General Inquiry - Patient Advice  >> Jul 9, 2025  9:08 AM Ez wrote:  Name of Who is Calling: Fauzia AGUILAR (Pt nurse)        What is the request in detail: pt's heart rate is 42 and hasn't taken any medication        Can the clinic reply by [facility]: no        What [facility] to [facility] Back [facility]: [phone]

## 2025-07-09 NOTE — TELEPHONE ENCOUNTER
Followed up with Fauzia, patient states that her HR is 42 and she has not taken her metoprolol today. She stated the only symptom she has is being fatigued. She wanted to bring to your attention that she uses an eye drop that contains a beta blocker. Please advise

## 2025-07-11 ENCOUNTER — HOSPITAL ENCOUNTER (OUTPATIENT)
Dept: RADIOLOGY | Facility: HOSPITAL | Age: 70
Discharge: HOME | End: 2025-07-11
Attending: STUDENT IN AN ORGANIZED HEALTH CARE EDUCATION/TRAINING PROGRAM
Payer: MEDICARE

## 2025-07-11 ENCOUNTER — OFFICE VISIT (OUTPATIENT)
Dept: FAMILY MEDICINE | Facility: CLINIC | Age: 70
End: 2025-07-11
Payer: MEDICARE

## 2025-07-11 VITALS
DIASTOLIC BLOOD PRESSURE: 78 MMHG | SYSTOLIC BLOOD PRESSURE: 130 MMHG | WEIGHT: 158 LBS | TEMPERATURE: 98 F | OXYGEN SATURATION: 96 % | BODY MASS INDEX: 26.33 KG/M2 | HEIGHT: 65 IN | HEART RATE: 52 BPM

## 2025-07-11 DIAGNOSIS — Z78.0 POSTMENOPAUSAL: ICD-10-CM

## 2025-07-11 DIAGNOSIS — N18.31 CHRONIC KIDNEY DISEASE, STAGE 3A: ICD-10-CM

## 2025-07-11 DIAGNOSIS — F41.9 ANXIETY: ICD-10-CM

## 2025-07-11 DIAGNOSIS — Z12.31 ENCOUNTER FOR SCREENING MAMMOGRAM FOR MALIGNANT NEOPLASM OF BREAST: ICD-10-CM

## 2025-07-11 DIAGNOSIS — I71.43 INFRARENAL ABDOMINAL AORTIC ANEURYSM (AAA) WITHOUT RUPTURE: Primary | ICD-10-CM

## 2025-07-11 DIAGNOSIS — F33.2 SEVERE EPISODE OF RECURRENT MAJOR DEPRESSIVE DISORDER, WITHOUT PSYCHOTIC FEATURES: ICD-10-CM

## 2025-07-11 DIAGNOSIS — E78.49 OTHER HYPERLIPIDEMIA: ICD-10-CM

## 2025-07-11 DIAGNOSIS — L98.421 SKIN ULCER OF SACRUM, LIMITED TO BREAKDOWN OF SKIN: ICD-10-CM

## 2025-07-11 DIAGNOSIS — I48.0 PAROXYSMAL ATRIAL FIBRILLATION: ICD-10-CM

## 2025-07-11 DIAGNOSIS — F10.10 ALCOHOL ABUSE: ICD-10-CM

## 2025-07-11 PROCEDURE — G2211 COMPLEX E/M VISIT ADD ON: HCPCS | Mod: S$GLB,,, | Performed by: STUDENT IN AN ORGANIZED HEALTH CARE EDUCATION/TRAINING PROGRAM

## 2025-07-11 PROCEDURE — 3008F BODY MASS INDEX DOCD: CPT | Mod: CPTII,S$GLB,, | Performed by: STUDENT IN AN ORGANIZED HEALTH CARE EDUCATION/TRAINING PROGRAM

## 2025-07-11 PROCEDURE — 3044F HG A1C LEVEL LT 7.0%: CPT | Mod: CPTII,S$GLB,, | Performed by: STUDENT IN AN ORGANIZED HEALTH CARE EDUCATION/TRAINING PROGRAM

## 2025-07-11 PROCEDURE — 1125F AMNT PAIN NOTED PAIN PRSNT: CPT | Mod: CPTII,S$GLB,, | Performed by: STUDENT IN AN ORGANIZED HEALTH CARE EDUCATION/TRAINING PROGRAM

## 2025-07-11 PROCEDURE — 77063 BREAST TOMOSYNTHESIS BI: CPT | Mod: 26,,, | Performed by: STUDENT IN AN ORGANIZED HEALTH CARE EDUCATION/TRAINING PROGRAM

## 2025-07-11 PROCEDURE — 77067 SCR MAMMO BI INCL CAD: CPT | Mod: 26,,, | Performed by: STUDENT IN AN ORGANIZED HEALTH CARE EDUCATION/TRAINING PROGRAM

## 2025-07-11 PROCEDURE — 3075F SYST BP GE 130 - 139MM HG: CPT | Mod: CPTII,S$GLB,, | Performed by: STUDENT IN AN ORGANIZED HEALTH CARE EDUCATION/TRAINING PROGRAM

## 2025-07-11 PROCEDURE — 1101F PT FALLS ASSESS-DOCD LE1/YR: CPT | Mod: CPTII,S$GLB,, | Performed by: STUDENT IN AN ORGANIZED HEALTH CARE EDUCATION/TRAINING PROGRAM

## 2025-07-11 PROCEDURE — 3288F FALL RISK ASSESSMENT DOCD: CPT | Mod: CPTII,S$GLB,, | Performed by: STUDENT IN AN ORGANIZED HEALTH CARE EDUCATION/TRAINING PROGRAM

## 2025-07-11 PROCEDURE — 99214 OFFICE O/P EST MOD 30 MIN: CPT | Mod: S$GLB,,, | Performed by: STUDENT IN AN ORGANIZED HEALTH CARE EDUCATION/TRAINING PROGRAM

## 2025-07-11 PROCEDURE — 77067 SCR MAMMO BI INCL CAD: CPT | Mod: TC,PO

## 2025-07-11 PROCEDURE — 99999 PR PBB SHADOW E&M-EST. PATIENT-LVL V: CPT | Mod: PBBFAC,,, | Performed by: STUDENT IN AN ORGANIZED HEALTH CARE EDUCATION/TRAINING PROGRAM

## 2025-07-11 PROCEDURE — 3078F DIAST BP <80 MM HG: CPT | Mod: CPTII,S$GLB,, | Performed by: STUDENT IN AN ORGANIZED HEALTH CARE EDUCATION/TRAINING PROGRAM

## 2025-07-11 RX ORDER — MULTIPLE VITAMINS W/ MINERALS TAB 9MG-400MCG
1 TAB ORAL
COMMUNITY
Start: 2025-06-12

## 2025-07-11 RX ORDER — ESCITALOPRAM OXALATE 20 MG/1
20 TABLET ORAL DAILY
Qty: 90 TABLET | Refills: 3 | Status: SHIPPED | OUTPATIENT
Start: 2025-07-11

## 2025-07-11 RX ORDER — HYDROCODONE BITARTRATE AND ACETAMINOPHEN 5; 325 MG/1; MG/1
1 TABLET ORAL EVERY 8 HOURS PRN
Qty: 90 TABLET | Refills: 0 | Status: SHIPPED | OUTPATIENT
Start: 2025-07-11

## 2025-07-11 RX ORDER — LORATADINE 10 MG/1
10 TABLET ORAL
COMMUNITY
Start: 2025-06-12 | End: 2025-07-16 | Stop reason: SDUPTHER

## 2025-07-11 NOTE — PROGRESS NOTES
1. Infrarenal abdominal aortic aneurysm (AAA) without rupture  Overview:  Chronic hx; on bb  US AAA 2022  Abdominal aortic aneurysm     CT ab/pelvis 2023 for hematochezia  Infrarenal abdominal aortic aneurysm measuring 4.5 x 4.4 cm in greatest axial dimension with moderate mural thrombus  - follow with vascular            2. Paroxysmal atrial fibrillation  Overview:  On anticoagulation  Follows with cards        3. Chronic kidney disease, stage 3a  Overview:  BMP  Lab Results   Component Value Date     06/02/2025    K 4.1 06/02/2025     06/02/2025    CO2 25 06/02/2025    BUN 12 06/02/2025    CREATININE 0.83 06/02/2025    CALCIUM 8.4 (L) 06/02/2025    ANIONGAP 10 06/02/2025    EGFRNORACEVR >60 06/02/2025     Chronic hx; stable       4. Alcohol abuse    5. Other hyperlipidemia  Overview:  -chronic condition. Currently stable.      Hyperlipidemia Medications               rosuvastatin (CRESTOR) 10 MG tablet Take 1 tablet (10 mg total) by mouth every evening.              Lab Results   Component Value Date    CHOL 213 (H) 03/06/2025    CHOL 258 (H) 05/22/2023    CHOL 247 (H) 12/01/2022     Lab Results   Component Value Date    HDL 42 03/06/2025    HDL 43 05/22/2023    HDL 46 12/01/2022     Lab Results   Component Value Date    LDLCALC 120 (H) 03/06/2025    LDLCALC 177.6 (H) 05/22/2023    LDLCALC 158.4 12/01/2022     Lab Results   Component Value Date    TRIG 255 (H) 03/06/2025    TRIG 187 (H) 05/22/2023    TRIG 213 (H) 12/01/2022     Lab Results   Component Value Date    CHOLHDL 5.1 (H) 03/06/2025    CHOLHDL 16.7 (L) 05/22/2023    CHOLHDL 18.6 (L) 12/01/2022          The 10-year ASCVD risk score (Gio HURST, et al., 2019) is: 15.6%    Values used to calculate the score:      Age: 70 years      Sex: Female      Is Non- : No      Diabetic: No      Tobacco smoker: Yes      Systolic Blood Pressure: 130 mmHg      Is BP treated: No      HDL Cholesterol: 42 mg/dL      Total Cholesterol: 213  "mg/dL        6. Anxiety  Overview:  Chronic history; doing well on valium bid (also takes for sz ppx)  Denies SI/HI; no hallucinations         Orders:  -     EScitalopram oxalate (LEXAPRO) 20 MG tablet; Take 1 tablet (20 mg total) by mouth once daily.  Dispense: 90 tablet; Refill: 3    7. Severe episode of recurrent major depressive disorder, without psychotic features  Overview:  Chronic hx; well controlled  Denies SI/HI; no hallucinations       Orders:  -     EScitalopram oxalate (LEXAPRO) 20 MG tablet; Take 1 tablet (20 mg total) by mouth once daily.  Dispense: 90 tablet; Refill: 3    8. Postmenopausal  -     DXA Bone Density Axial Skeleton 1 or more sites; Future; Expected date: 07/11/2025    9. Skin ulcer of sacrum, limited to breakdown of skin  -     HYDROcodone-acetaminophen (NORCO) 5-325 mg per tablet; Take 1 tablet by mouth every 8 (eight) hours as needed for Pain.  Dispense: 90 tablet; Refill: 0       FOLLOW-UP AND PREVENTIVE CARE:  - Ordered mammogram.  - Follow up in 3 months to discuss dermatology consultation outcomes, colonoscopy, and pap smear.  - Patient instructed to contact office if experiencing abdominal pain, bleeding, or other concerning symptoms before scheduled follow-up.            Vandana Arriaga MD  _________________________________________________________________________      Patient ID: Fauzia Cornelius is a 70 y.o. female.    History of Present Illness    CHIEF COMPLAINT:  Patient presents today for follow up of lower back pain.    MUSCULOSKELETAL:  She reports ongoing lower back pain and has completed an MRI for further evaluation.    MENTAL HEALTH:  She reports significant improvement in her mood on Lexapro 20mg. Previously, she experienced depression which she describes as feeling "lonely" and with "doom and gloom" thoughts, which contributed to alcohol use. She is currently maintaining sobriety and reports feeling "much better."    DERMATOLOGIC:  She reports ongoing dermatitis with " intermittent skin lesions on back and shoulder that can spontaneously bleed when cut. Currently denies active itching. Has not had dermatology follow-up for an extended period.    SLEEP:  She reports improved sleep quality with Seroquel.    CARDIAC:  She is scheduled for Holter monitor placement on Monday. Family history significant for mother with quadruple bypass surgery and father with triple bypass surgery.      Past medical histories reviewed, including past medical, surgical, family and social histories.      Medications Ordered Prior to Encounter[1]    Review of Systems   12 point review of systems negative except for listed in HPI.     Objective:    Nursing note and vitals reviewed.  Vitals:    07/11/25 1533   BP: 130/78   Pulse: (!) 52   Temp: 97.6 °F (36.4 °C)     Body mass index is 26.29 kg/m².     Physical Exam   Constitutional: oriented to person, place, and time. well-developed and well-nourished. No distress.   HENT: WNL  Head: Normocephalic and atraumatic.   Eyes: EOM are normal.   Neck: Normal range of motion. Neck supple.   Cardiovascular: mildly bradycardic   Pulmonary/Chest: Effort normal. No respiratory distress.   GI: soft, non distended, no ttp, no rebound/guarding  Musculoskeletal: Normal range of motion. no edema.   Neurological: CN II-XII intact  Skin: warm and dry.   Psychiatric: normal mood and affect. behavior is normal.   Physical Exam    Skin: Spontaneous bleeding from skin lesions. Birthmark on forehead with erythema. Skin soft, no pain on palpation.            We Offer Telehealth & Same Day Appointments!   Book your Telehealth appointment with me through my nurse or   Clinic appointments on [employer]!  [phone]     To Schedule appointments online, go to [employer]: [email or web address]       Visit today included increased complexity associated with the care of the episodic problem addressed and managing the longitudinal care of the patient due to the  serious and/or complex managed problem(s) as per problem list.     This note was generated with the assistance of ambient listening technology. Verbal consent was obtained by the patient and accompanying visitor(s) for the recording of patient appointment to facilitate this note. I attest to having reviewed and edited the generated note for accuracy, though some syntax or spelling errors may persist. Please contact the author of this note for any clarification.            [1]   Current Outpatient Medications on File Prior to Visit   Medication Sig Dispense Refill    B complex 11-folic-C-biot-zinc 0-414-292-50 mg-mg-mcg-mg Tab Take 1 tab daily 90 each 3    brimonidine 0.15 % OPTH DROP (ALPHAGAN) 0.15 % ophthalmic solution Place 1 drop into both eyes 3 (three) times daily. 15 mL 1    cyanocobalamin 500 MCG tablet Take 250 mcg by mouth once daily.      ELIQUIS 5 mg Tab Take 5 mg by mouth 2 (two) times daily.      ergocalciferol (ERGOCALCIFEROL) 50,000 unit Cap Take 50,000 Units by mouth every 7 days.      hydrOXYzine HCL (ATARAX) 25 MG tablet Take 1 tablet (25 mg total) by mouth 3 (three) times daily as needed for Itching. 90 tablet 3    latanoprost 0.005 % ophthalmic solution put ONE drop IN EACH EYE EVERY DAY 2.5 mL 11    multivitamin (THERAGRAN) per tablet Take 1 tablet by mouth.      multivitamin with folic acid 400 mcg Tab Take 1 tablet by mouth once daily.      potassium chloride SA (K-DUR,KLOR-CON) 20 MEQ tablet Take 20 mEq by mouth once daily.      RHOPRESSA 0.02 % ophthalmic solution SMARTSI Drop(s) Right Eye Every Evening      rosuvastatin (CRESTOR) 10 MG tablet TAKE 1 TABLET BY MOUTH DAILY EVERY EVENING 90 tablet 3    THERA-M 9 mg iron-400 mcg Tab tablet Take 1 tablet by mouth.      thiamine mononitrate, vit B1, (VITAMIN B-1, MONONITRATE,) 100 mg Tab Take 100 mg by mouth once daily.      diazePAM (VALIUM) 5 MG tablet Take 1 tablet (5 mg total) by mouth 2 (two) times a day. 60 tablet 5     dorzolamide-timolol 2-0.5% (COSOPT) 22.3-6.8 mg/mL ophthalmic solution Place 1 drop into both eyes 3 (three) times daily. 10 mL 3     No current facility-administered medications on file prior to visit.

## 2025-07-14 ENCOUNTER — HOSPITAL ENCOUNTER (OUTPATIENT)
Dept: CARDIOLOGY | Facility: HOSPITAL | Age: 70
Discharge: HOME | End: 2025-07-14
Attending: INTERNAL MEDICINE
Payer: MEDICARE

## 2025-07-14 DIAGNOSIS — I48.91 ATRIAL FIBRILLATION WITH RVR: ICD-10-CM

## 2025-07-16 ENCOUNTER — TELEPHONE (OUTPATIENT)
Dept: CARDIOLOGY | Facility: CLINIC | Age: 70
End: 2025-07-16
Payer: MEDICARE

## 2025-07-16 DIAGNOSIS — F10.10 ALCOHOL ABUSE: ICD-10-CM

## 2025-07-16 DIAGNOSIS — L29.9 ITCHING: Primary | ICD-10-CM

## 2025-07-16 DIAGNOSIS — F43.10 PTSD (POST-TRAUMATIC STRESS DISORDER): ICD-10-CM

## 2025-07-16 RX ORDER — LORATADINE 10 MG/1
10 TABLET ORAL DAILY PRN
Qty: 90 TABLET | Refills: 3 | Status: SHIPPED | OUTPATIENT
Start: 2025-07-16

## 2025-07-16 RX ORDER — LEVOTHYROXINE SODIUM 25 UG/1
25 TABLET ORAL
Qty: 90 TABLET | Refills: 3 | Status: SHIPPED | OUTPATIENT
Start: 2025-07-16

## 2025-07-16 RX ORDER — FOLIC ACID 1 MG/1
1 TABLET ORAL DAILY
Qty: 90 TABLET | Refills: 3 | Status: SHIPPED | OUTPATIENT
Start: 2025-07-16

## 2025-07-16 RX ORDER — QUETIAPINE FUMARATE 50 MG/1
50 TABLET, FILM COATED ORAL NIGHTLY
Qty: 90 TABLET | Refills: 3 | Status: SHIPPED | OUTPATIENT
Start: 2025-07-16 | End: 2025-07-16 | Stop reason: SDUPTHER

## 2025-07-16 RX ORDER — QUETIAPINE FUMARATE 50 MG/1
50 TABLET, FILM COATED ORAL NIGHTLY
Qty: 90 TABLET | Refills: 3 | Status: SHIPPED | OUTPATIENT
Start: 2025-07-16

## 2025-07-16 NOTE — TELEPHONE ENCOUNTER
No care due was identified.  [facility] Embedded Care Due Messages. Reference number: [id number].   7/16/2025 3:38:01 PM CDT

## 2025-07-16 NOTE — TELEPHONE ENCOUNTER
Care Due:                  Date            Visit Type   Department     Provider  --------------------------------------------------------------------------------                                EP -                              PRIMARY      [facility] FAMILY  Last Visit: 07-      CARE ([facility])   [facility] -                              PRIMARY      [facility] FAMILY  Next Visit: 10-      CARE ([facility])   [facility]  Test          Frequency    Reason                     Performed    Due Date  --------------------------------------------------------------------------------    Lipid Panel.  12 months..  rosuvastatin.............  05- 07-    [facility] Embedded Care Due Messages. Reference number: [id number].   7/16/2025 2:07:36 PM CDT

## 2025-07-16 NOTE — TELEPHONE ENCOUNTER
Copied from CRM #[id number]. Topic: General Inquiry - Patient Advice  >> Jul 16, 2025  1:53 PM Oliva wrote:  .Type:  Needs Medical Advice    Who Called:.Fauzia Cornelius  Symptoms (please be specific): LOW HEART RATE    How long has patient had these symptoms:  WEEK  Pharmacy name and phone #:    Would the patient rather a call back or a response via [facility]? CALL BACK  Best Call Back Number: .[phone]  Additional Information: PT NURSE HAS OBSERVED PT HAVING A LOW HEART RATE TWO WEEKS IN A ROW

## 2025-07-16 NOTE — TELEPHONE ENCOUNTER
Copied from CRM #[id number]. Topic: Medications - Medication Refill  >> Jul 16, 2025  1:55 PM Oliva wrote:  .Type:  RX Refill Request    Who Called: RAMESH ([facility])  Refill or New Rx: REFILL  RX Name and Strength: levothyroxine (SYNTHROID) 25 MCG tablet    QUEtiapine (SEROQUEL) 50 MG tablet  How is the patient currently taking it? (ex. 1XDay):  Is this a 30 day or 90 day RX:  Preferred Pharmacy with phone number:[facility] [place] - [address] [facility] [address]  Phone: [phone] Fax: [phone]      Local or Mail Order: LOCAL  Ordering Provider:  Would the patient rather a call back or a response via [facility]? CALL BACK  Best Call Back Number:.[phone]  Additional Information: PT NURSE IS REQUESTING A REFILL  >> Jul 16, 2025  3:26 PM Vandana Arriaga MD wrote:  Meds refilled  ----- Message -----  From: Germaine Turner MA  Sent: 7/16/2025   2:10 PM CDT  To: Vandana Arriaga MD      ----- Message -----  From: Oliva Escobar  Sent: 7/16/2025   1:58 PM CDT  To: Angie FOWLER Jr Staff    >> Jul 16, 2025  2:10 PM Med Assistant Germaine wrote:    ----- Message -----  From: Oliva Escobar  Sent: 7/16/2025   1:58 PM CDT  To: Angie Tobias

## 2025-07-16 NOTE — TELEPHONE ENCOUNTER
Followed up with Fauzia, I explained to the patient that Dr. Adams was aware of her low HR and that is why he ordered the heart monitor that she is currently wearing. She denies any other symptoms at this time.

## 2025-07-16 NOTE — TELEPHONE ENCOUNTER
Copied from CRM #[id number]. Topic: Medications - Medication Refill  >> Jul 16, 2025  1:44 PM Oliva wrote:  .Type:  RX Refill Request    Who Called: RAMESH ([facility])  Refill or New Rx:REFILL  RX Name and Strength:  loratadine (CLARITIN) 10 mg tablet  folic acid (FOLVITE) 1 MG tablet    Preferred Pharmacy with phone number: .  [facility]  [place] - [address] [address] [facility] [address]  Phone: [phone] Fax: [phone]      Local or Mail Order: LOCAL  Ordering Provider:ROYAL  Would the patient rather a call back or a response via [facility]? CALL BACK  Best Call Back Number:.[phone]  Additional Information: PT NURSE IS CALLING TO GT REFILLS ON PT MEDICATION

## 2025-07-22 ENCOUNTER — TELEPHONE (OUTPATIENT)
Dept: CARDIOLOGY | Facility: CLINIC | Age: 70
End: 2025-07-22
Payer: MEDICARE

## 2025-07-22 NOTE — TELEPHONE ENCOUNTER
Copied from CRM #[id number]. Topic: General Inquiry - Patient Advice  >> Jul 22, 2025 11:22 AM Jayce Barclay wrote:  Type:  Needs Medical Advice    Who Called: [facility]    Symptoms (please be specific): Low HR    Would the patient rather a call back or a response via [facility]? Call back    Best Call Back Number: [phone]    Additional Information: Pt's HR today is 40; pt has not been taking her metoprolol since the drArthur Stopped it 2 wks ago; pt stated she's not having any symptoms; requesting call back from nurse

## 2025-08-06 ENCOUNTER — PATIENT MESSAGE (OUTPATIENT)
Dept: CARDIOLOGY | Facility: CLINIC | Age: 70
End: 2025-08-06
Payer: MEDICARE

## 2025-08-07 DIAGNOSIS — I71.61 SUPRACELIAC ABDOMINAL AORTIC ANEURYSM (AAA) WITHOUT RUPTURE: Primary | ICD-10-CM

## 2025-08-10 ENCOUNTER — NURSE TRIAGE (OUTPATIENT)
Dept: ADMINISTRATIVE | Facility: CLINIC | Age: 70
End: 2025-08-10
Payer: MEDICARE

## 2025-08-10 ENCOUNTER — OCHSNER VIRTUAL EMERGENCY DEPARTMENT (OUTPATIENT)
Facility: CLINIC | Age: 70
End: 2025-08-10
Payer: MEDICARE

## 2025-08-10 DIAGNOSIS — I71.40 ABDOMINAL AORTIC ANEURYSM (AAA) WITHOUT RUPTURE, UNSPECIFIED PART: Primary | ICD-10-CM

## 2025-08-10 DIAGNOSIS — K59.00 CONSTIPATION, UNSPECIFIED CONSTIPATION TYPE: ICD-10-CM

## 2025-08-11 ENCOUNTER — PATIENT MESSAGE (OUTPATIENT)
Dept: FAMILY MEDICINE | Facility: CLINIC | Age: 70
End: 2025-08-11
Payer: MEDICARE

## 2025-08-11 ENCOUNTER — TELEPHONE (OUTPATIENT)
Dept: VASCULAR SURGERY | Facility: CLINIC | Age: 70
End: 2025-08-11
Payer: MEDICARE

## 2025-08-11 DIAGNOSIS — L29.9 ITCHING: ICD-10-CM

## 2025-08-11 RX ORDER — HYDROXYZINE HYDROCHLORIDE 25 MG/1
25 TABLET, FILM COATED ORAL
Qty: 90 TABLET | Refills: 3 | Status: SHIPPED | OUTPATIENT
Start: 2025-08-11

## 2025-08-18 ENCOUNTER — TELEPHONE (OUTPATIENT)
Dept: VASCULAR SURGERY | Facility: CLINIC | Age: 70
End: 2025-08-18
Payer: MEDICARE

## 2025-08-20 ENCOUNTER — DOCUMENTATION ONLY (OUTPATIENT)
Dept: VASCULAR SURGERY | Facility: CLINIC | Age: 70
End: 2025-08-20
Payer: MEDICARE

## 2025-08-20 ENCOUNTER — INITIAL CONSULT (OUTPATIENT)
Dept: VASCULAR SURGERY | Facility: CLINIC | Age: 70
End: 2025-08-20
Payer: MEDICARE

## 2025-08-20 VITALS
BODY MASS INDEX: 26.81 KG/M2 | DIASTOLIC BLOOD PRESSURE: 98 MMHG | TEMPERATURE: 98 F | HEART RATE: 69 BPM | SYSTOLIC BLOOD PRESSURE: 158 MMHG | HEIGHT: 65 IN | WEIGHT: 160.94 LBS

## 2025-08-20 DIAGNOSIS — I71.40 ABDOMINAL AORTIC ANEURYSM (AAA) WITHOUT RUPTURE, UNSPECIFIED PART: Primary | ICD-10-CM

## 2025-08-20 DIAGNOSIS — Z98.890 S/P ENDOVASCULAR ANEURYSM REPAIR: Primary | ICD-10-CM

## 2025-08-20 DIAGNOSIS — I71.42 JUXTARENAL ABDOMINAL AORTIC ANEURYSM (AAA) WITHOUT RUPTURE: ICD-10-CM

## 2025-08-20 DIAGNOSIS — Z01.818 PREOP TESTING: ICD-10-CM

## 2025-08-20 DIAGNOSIS — Z86.79 S/P ENDOVASCULAR ANEURYSM REPAIR: Primary | ICD-10-CM

## 2025-08-20 PROCEDURE — 99999 PR PBB SHADOW E&M-EST. PATIENT-LVL IV: CPT | Mod: PBBFAC,,, | Performed by: SURGERY

## 2025-08-20 RX ORDER — LIDOCAINE HYDROCHLORIDE 10 MG/ML
1 INJECTION, SOLUTION EPIDURAL; INFILTRATION; INTRACAUDAL; PERINEURAL ONCE
OUTPATIENT
Start: 2025-08-20 | End: 2025-08-20

## 2025-08-20 RX ORDER — MUPIROCIN 20 MG/G
OINTMENT TOPICAL
OUTPATIENT
Start: 2025-08-20

## 2025-08-20 RX ORDER — SODIUM CHLORIDE 0.9 % (FLUSH) 0.9 %
10 SYRINGE (ML) INJECTION
OUTPATIENT
Start: 2025-08-20

## 2025-08-20 RX ORDER — LEVOFLOXACIN 750 MG/1
750 TABLET, FILM COATED ORAL
COMMUNITY
Start: 2025-05-02

## 2025-08-21 ENCOUNTER — HOSPITAL ENCOUNTER (OUTPATIENT)
Dept: RADIOLOGY | Facility: HOSPITAL | Age: 70
Discharge: HOME | End: 2025-08-21
Attending: SURGERY
Payer: MEDICARE

## 2025-08-21 ENCOUNTER — TELEPHONE (OUTPATIENT)
Dept: VASCULAR SURGERY | Facility: CLINIC | Age: 70
End: 2025-08-21
Payer: MEDICARE

## 2025-08-21 DIAGNOSIS — Z01.818 PREOP TESTING: ICD-10-CM

## 2025-08-21 PROCEDURE — 71046 X-RAY EXAM CHEST 2 VIEWS: CPT | Mod: 26,,, | Performed by: RADIOLOGY

## 2025-08-21 PROCEDURE — 71046 X-RAY EXAM CHEST 2 VIEWS: CPT | Mod: TC,PO

## 2025-09-02 ENCOUNTER — TELEPHONE (OUTPATIENT)
Dept: VASCULAR SURGERY | Facility: CLINIC | Age: 70
End: 2025-09-02
Payer: MEDICARE